# Patient Record
Sex: FEMALE | Race: WHITE | NOT HISPANIC OR LATINO | Employment: OTHER | URBAN - METROPOLITAN AREA
[De-identification: names, ages, dates, MRNs, and addresses within clinical notes are randomized per-mention and may not be internally consistent; named-entity substitution may affect disease eponyms.]

---

## 2019-01-01 ENCOUNTER — ANESTHESIA (OUTPATIENT)
Dept: SURGERY | Facility: HOSPITAL | Age: 84
DRG: 535 | End: 2019-01-01
Payer: MEDICARE

## 2019-01-01 ENCOUNTER — OUTSIDE PLACE OF SERVICE (OUTPATIENT)
Dept: ADMINISTRATIVE | Facility: OTHER | Age: 84
End: 2019-01-01
Payer: MEDICARE

## 2019-01-01 ENCOUNTER — ANESTHESIA EVENT (OUTPATIENT)
Dept: SURGERY | Facility: HOSPITAL | Age: 84
DRG: 535 | End: 2019-01-01
Payer: MEDICARE

## 2019-01-01 ENCOUNTER — HOSPITAL ENCOUNTER (INPATIENT)
Facility: HOSPITAL | Age: 84
LOS: 2 days | DRG: 535 | End: 2019-02-20
Attending: FAMILY MEDICINE | Admitting: INTERNAL MEDICINE
Payer: MEDICARE

## 2019-01-01 VITALS
WEIGHT: 104.94 LBS | TEMPERATURE: 98 F | DIASTOLIC BLOOD PRESSURE: 50 MMHG | OXYGEN SATURATION: 96 % | HEART RATE: 79 BPM | BODY MASS INDEX: 16.87 KG/M2 | HEIGHT: 66 IN | SYSTOLIC BLOOD PRESSURE: 88 MMHG | RESPIRATION RATE: 18 BRPM

## 2019-01-01 DIAGNOSIS — I25.10 CAD (CORONARY ARTERY DISEASE): ICD-10-CM

## 2019-01-01 DIAGNOSIS — S72.002A CLOSED FRACTURE OF LEFT HIP, INITIAL ENCOUNTER: Primary | ICD-10-CM

## 2019-01-01 DIAGNOSIS — S72.002A CLOSED FRACTURE OF LEFT HIP: ICD-10-CM

## 2019-01-01 DIAGNOSIS — I95.9 HYPOTENSION: ICD-10-CM

## 2019-01-01 DIAGNOSIS — K55.9 MESENTERIC ISCHEMIA: ICD-10-CM
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
## 2019-01-01 DIAGNOSIS — K55.1 CHRONIC MESENTERIC ISCHEMIA: ICD-10-CM

## 2019-01-01 LAB
ABO + RH BLD: NORMAL
ALBUMIN SERPL BCP-MCNC: 1.4 G/DL
ALBUMIN SERPL BCP-MCNC: 1.8 G/DL
ALLENS TEST: ABNORMAL
ALP SERPL-CCNC: 110 U/L
ALP SERPL-CCNC: 93 U/L
ALT SERPL W/O P-5'-P-CCNC: 12 U/L
ALT SERPL W/O P-5'-P-CCNC: 23 U/L
ANION GAP SERPL CALC-SCNC: 10 MMOL/L
ANION GAP SERPL CALC-SCNC: 13 MMOL/L
ANION GAP SERPL CALC-SCNC: 15 MMOL/L
ANISOCYTOSIS BLD QL SMEAR: SLIGHT
ANISOCYTOSIS BLD QL SMEAR: SLIGHT
AORTIC VALVE REGURGITATION: ABNORMAL
APTT BLDCRRT: 43.2 SEC
APTT BLDCRRT: 73 SEC
AST SERPL-CCNC: 28 U/L
AST SERPL-CCNC: 66 U/L
BACTERIA #/AREA URNS HPF: ABNORMAL /HPF
BASOPHILS # BLD AUTO: 0.02 K/UL
BASOPHILS # BLD AUTO: 0.02 K/UL
BASOPHILS # BLD AUTO: 0.03 K/UL
BASOPHILS NFR BLD: 0.1 %
BASOPHILS NFR BLD: 0.1 %
BASOPHILS NFR BLD: 0.2 %
BILIRUB DIRECT SERPL-MCNC: 0.3 MG/DL
BILIRUB SERPL-MCNC: 0.4 MG/DL
BILIRUB SERPL-MCNC: 0.6 MG/DL
BILIRUB UR QL STRIP: ABNORMAL
BLD GP AB SCN CELLS X3 SERPL QL: NORMAL
BNP SERPL-MCNC: 545 PG/ML
BNP SERPL-MCNC: 870 PG/ML
BUN SERPL-MCNC: 43 MG/DL
BUN SERPL-MCNC: 45 MG/DL
BUN SERPL-MCNC: 48 MG/DL
BURR CELLS BLD QL SMEAR: ABNORMAL
BURR CELLS BLD QL SMEAR: ABNORMAL
CALCIUM SERPL-MCNC: 6.8 MG/DL
CALCIUM SERPL-MCNC: 7.3 MG/DL
CALCIUM SERPL-MCNC: 7.3 MG/DL
CHLORIDE SERPL-SCNC: 106 MMOL/L
CHLORIDE SERPL-SCNC: 108 MMOL/L
CHLORIDE SERPL-SCNC: 110 MMOL/L
CLARITY UR: CLEAR
CO2 SERPL-SCNC: 11 MMOL/L
CO2 SERPL-SCNC: 12 MMOL/L
CO2 SERPL-SCNC: 19 MMOL/L
COLOR UR: YELLOW
CREAT SERPL-MCNC: 1.7 MG/DL
CREAT SERPL-MCNC: 2.2 MG/DL
CREAT SERPL-MCNC: 2.5 MG/DL
DACRYOCYTES BLD QL SMEAR: ABNORMAL
DELSYS: ABNORMAL
DIASTOLIC DYSFUNCTION: YES
DIFFERENTIAL METHOD: ABNORMAL
EOSINOPHIL # BLD AUTO: 0 K/UL
EOSINOPHIL NFR BLD: 0 %
EOSINOPHIL NFR BLD: 0 %
EOSINOPHIL NFR BLD: 0.1 %
ERYTHROCYTE [DISTWIDTH] IN BLOOD BY AUTOMATED COUNT: 16 %
ERYTHROCYTE [DISTWIDTH] IN BLOOD BY AUTOMATED COUNT: 16.2 %
ERYTHROCYTE [DISTWIDTH] IN BLOOD BY AUTOMATED COUNT: 16.5 %
EST. GFR  (AFRICAN AMERICAN): 20 ML/MIN/1.73 M^2
EST. GFR  (AFRICAN AMERICAN): 24 ML/MIN/1.73 M^2
EST. GFR  (AFRICAN AMERICAN): 32 ML/MIN/1.73 M^2
EST. GFR  (NON AFRICAN AMERICAN): 17 ML/MIN/1.73 M^2
EST. GFR  (NON AFRICAN AMERICAN): 20 ML/MIN/1.73 M^2
EST. GFR  (NON AFRICAN AMERICAN): 28 ML/MIN/1.73 M^2
ESTIMATED AVG GLUCOSE: 97 MG/DL
ESTIMATED PA SYSTOLIC PRESSURE: 43.56
FIO2: 32
FLOW: 3
GLUCOSE SERPL-MCNC: 100 MG/DL
GLUCOSE SERPL-MCNC: 143 MG/DL
GLUCOSE SERPL-MCNC: 69 MG/DL
GLUCOSE UR QL STRIP: NEGATIVE
HBA1C MFR BLD HPLC: 5 %
HCO3 UR-SCNC: 9.2 MMOL/L (ref 24–28)
HCT VFR BLD AUTO: 26.1 %
HCT VFR BLD AUTO: 30.2 %
HCT VFR BLD AUTO: 30.7 %
HGB BLD-MCNC: 10 G/DL
HGB BLD-MCNC: 10.1 G/DL
HGB BLD-MCNC: 8.6 G/DL
HGB UR QL STRIP: NEGATIVE
INFLUENZA A, MOLECULAR: NEGATIVE
INFLUENZA B, MOLECULAR: NEGATIVE
INR PPP: 1.3
INR PPP: 2.1
KETONES UR QL STRIP: ABNORMAL
LACTATE SERPL-SCNC: 0.9 MMOL/L
LACTATE SERPL-SCNC: 2 MMOL/L
LACTATE SERPL-SCNC: 3.9 MMOL/L
LACTATE SERPL-SCNC: 4 MMOL/L
LACTATE SERPL-SCNC: 7.6 MMOL/L
LEUKOCYTE ESTERASE UR QL STRIP: ABNORMAL
LYMPHOCYTES # BLD AUTO: 0.5 K/UL
LYMPHOCYTES # BLD AUTO: 0.7 K/UL
LYMPHOCYTES # BLD AUTO: 0.8 K/UL
LYMPHOCYTES NFR BLD: 2.5 %
LYMPHOCYTES NFR BLD: 2.6 %
LYMPHOCYTES NFR BLD: 4 %
MAGNESIUM SERPL-MCNC: 1.4 MG/DL
MAGNESIUM SERPL-MCNC: 1.6 MG/DL
MAGNESIUM SERPL-MCNC: 1.9 MG/DL
MCH RBC QN AUTO: 29.7 PG
MCH RBC QN AUTO: 30.1 PG
MCH RBC QN AUTO: 30.4 PG
MCHC RBC AUTO-ENTMCNC: 32.6 G/DL
MCHC RBC AUTO-ENTMCNC: 33 G/DL
MCHC RBC AUTO-ENTMCNC: 33.4 G/DL
MCV RBC AUTO: 91 FL
MICROSCOPIC COMMENT: ABNORMAL
MITRAL VALVE REGURGITATION: ABNORMAL
MODE: ABNORMAL
MONOCYTES # BLD AUTO: 0.9 K/UL
MONOCYTES # BLD AUTO: 1.3 K/UL
MONOCYTES # BLD AUTO: 1.5 K/UL
MONOCYTES NFR BLD: 4.7 %
MONOCYTES NFR BLD: 4.7 %
MONOCYTES NFR BLD: 7.1 %
NEUTROPHILS # BLD AUTO: 11.1 K/UL
NEUTROPHILS # BLD AUTO: 24.6 K/UL
NEUTROPHILS # BLD AUTO: 28.8 K/UL
NEUTROPHILS NFR BLD: 89.2 %
NEUTROPHILS NFR BLD: 94.1 %
NEUTROPHILS NFR BLD: 96.1 %
NITRITE UR QL STRIP: NEGATIVE
OVALOCYTES BLD QL SMEAR: ABNORMAL
PCO2 BLDA: 34.1 MMHG (ref 35–45)
PH SMN: 7.04 [PH] (ref 7.35–7.45)
PH UR STRIP: 6 [PH] (ref 5–8)
PHOSPHATE SERPL-MCNC: 3.9 MG/DL
PHOSPHATE SERPL-MCNC: 4.9 MG/DL
PLATELET # BLD AUTO: 156 K/UL
PLATELET # BLD AUTO: 166 K/UL
PLATELET # BLD AUTO: 189 K/UL
PLATELET BLD QL SMEAR: ABNORMAL
PLATELET BLD QL SMEAR: ABNORMAL
PMV BLD AUTO: 10.2 FL
PMV BLD AUTO: 10.5 FL
PMV BLD AUTO: 10.8 FL
PO2 BLDA: 18 MMHG (ref 40–60)
POC BE: -21 MMOL/L
POC SATURATED O2: 14 % (ref 95–100)
POCT GLUCOSE: 109 MG/DL (ref 70–110)
POCT GLUCOSE: 134 MG/DL (ref 70–110)
POCT GLUCOSE: 164 MG/DL (ref 70–110)
POCT GLUCOSE: 28 MG/DL (ref 70–110)
POIKILOCYTOSIS BLD QL SMEAR: SLIGHT
POLYCHROMASIA BLD QL SMEAR: ABNORMAL
POLYCHROMASIA BLD QL SMEAR: ABNORMAL
POTASSIUM SERPL-SCNC: 4.1 MMOL/L
POTASSIUM SERPL-SCNC: 4.3 MMOL/L
POTASSIUM SERPL-SCNC: 4.6 MMOL/L
PROCALCITONIN SERPL IA-MCNC: 0.61 NG/ML
PROCALCITONIN SERPL IA-MCNC: 1.41 NG/ML
PROT SERPL-MCNC: 3.5 G/DL
PROT SERPL-MCNC: 4.1 G/DL
PROT UR QL STRIP: NEGATIVE
PROTHROMBIN TIME: 13.7 SEC
PROTHROMBIN TIME: 21.7 SEC
RBC # BLD AUTO: 2.86 M/UL
RBC # BLD AUTO: 3.32 M/UL
RBC # BLD AUTO: 3.37 M/UL
RETIRED EF AND QEF - SEE NOTES: 55 (ref 55–65)
SAMPLE: ABNORMAL
SCHISTOCYTES BLD QL SMEAR: PRESENT
SCHISTOCYTES BLD QL SMEAR: PRESENT
SITE: ABNORMAL
SODIUM SERPL-SCNC: 134 MMOL/L
SODIUM SERPL-SCNC: 135 MMOL/L
SODIUM SERPL-SCNC: 135 MMOL/L
SP GR UR STRIP: 1.02 (ref 1–1.03)
SPECIMEN SOURCE: NORMAL
SPHEROCYTES BLD QL SMEAR: ABNORMAL
STOMATOCYTES BLD QL SMEAR: PRESENT
T4 FREE SERPL-MCNC: 0.44 NG/DL
TRICUSPID VALVE REGURGITATION: ABNORMAL
TROPONIN I SERPL DL<=0.01 NG/ML-MCNC: 0.02 NG/ML
TSH SERPL DL<=0.005 MIU/L-ACNC: 8.7 UIU/ML
URN SPEC COLLECT METH UR: ABNORMAL
UROBILINOGEN UR STRIP-ACNC: NEGATIVE EU/DL
VANCOMYCIN SERPL-MCNC: 11 UG/ML
VANCOMYCIN SERPL-MCNC: 15.8 UG/ML
WBC # BLD AUTO: 12.52 K/UL
WBC # BLD AUTO: 26.53 K/UL
WBC # BLD AUTO: 31.16 K/UL
WBC #/AREA URNS HPF: 50 /HPF (ref 0–5)
WBC CLUMPS URNS QL MICRO: ABNORMAL

## 2019-01-01 PROCEDURE — 99222 1ST HOSP IP/OBS MODERATE 55: CPT | Mod: ,,, | Performed by: SURGERY

## 2019-01-01 PROCEDURE — 75726 CHG ANGIO VISCERAL SELECTV/SUBSELEC: ICD-10-PCS | Mod: 26,,, | Performed by: THORACIC SURGERY (CARDIOTHORACIC VASCULAR SURGERY)

## 2019-01-01 PROCEDURE — 96375 TX/PRO/DX INJ NEW DRUG ADDON: CPT

## 2019-01-01 PROCEDURE — 85730 THROMBOPLASTIN TIME PARTIAL: CPT

## 2019-01-01 PROCEDURE — 25000003 PHARM REV CODE 250: Performed by: NURSE PRACTITIONER

## 2019-01-01 PROCEDURE — 84439 ASSAY OF FREE THYROXINE: CPT

## 2019-01-01 PROCEDURE — 87186 SC STD MICRODIL/AGAR DIL: CPT | Mod: 59

## 2019-01-01 PROCEDURE — 93010 ELECTROCARDIOGRAM REPORT: CPT | Mod: ,,, | Performed by: INTERNAL MEDICINE

## 2019-01-01 PROCEDURE — 80048 BASIC METABOLIC PNL TOTAL CA: CPT

## 2019-01-01 PROCEDURE — 93306 TTE W/DOPPLER COMPLETE: CPT | Mod: 26,,, | Performed by: INTERNAL MEDICINE

## 2019-01-01 PROCEDURE — 99152 MOD SED SAME PHYS/QHP 5/>YRS: CPT | Mod: ,,, | Performed by: THORACIC SURGERY (CARDIOTHORACIC VASCULAR SURGERY)

## 2019-01-01 PROCEDURE — 25000003 PHARM REV CODE 250: Performed by: INTERNAL MEDICINE

## 2019-01-01 PROCEDURE — 99223 1ST HOSP IP/OBS HIGH 75: CPT | Mod: ,,, | Performed by: ORTHOPAEDIC SURGERY

## 2019-01-01 PROCEDURE — 99900037 HC PT THERAPY SCREENING (STAT)

## 2019-01-01 PROCEDURE — 63600175 PHARM REV CODE 636 W HCPCS: Performed by: NURSE PRACTITIONER

## 2019-01-01 PROCEDURE — 94761 N-INVAS EAR/PLS OXIMETRY MLT: CPT

## 2019-01-01 PROCEDURE — 87077 CULTURE AEROBIC IDENTIFY: CPT | Mod: 59

## 2019-01-01 PROCEDURE — 99900035 HC TECH TIME PER 15 MIN (STAT)

## 2019-01-01 PROCEDURE — 84145 PROCALCITONIN (PCT): CPT

## 2019-01-01 PROCEDURE — 93010 EKG 12-LEAD: ICD-10-PCS | Mod: ,,, | Performed by: INTERNAL MEDICINE

## 2019-01-01 PROCEDURE — 83605 ASSAY OF LACTIC ACID: CPT

## 2019-01-01 PROCEDURE — 87502 INFLUENZA DNA AMP PROBE: CPT

## 2019-01-01 PROCEDURE — 27000221 HC OXYGEN, UP TO 24 HOURS

## 2019-01-01 PROCEDURE — 36245 PR INS CATH ABD/L-EXT ART 1ST ORDER: ICD-10-PCS | Mod: ,,, | Performed by: THORACIC SURGERY (CARDIOTHORACIC VASCULAR SURGERY)

## 2019-01-01 PROCEDURE — 21400001 HC TELEMETRY ROOM

## 2019-01-01 PROCEDURE — 63600175 PHARM REV CODE 636 W HCPCS: Performed by: INTERNAL MEDICINE

## 2019-01-01 PROCEDURE — 99223 PR INITIAL HOSPITAL CARE,LEVL III: ICD-10-PCS | Mod: ,,, | Performed by: ORTHOPAEDIC SURGERY

## 2019-01-01 PROCEDURE — 75726 ARTERY X-RAYS ABDOMEN: CPT | Mod: 26,,, | Performed by: THORACIC SURGERY (CARDIOTHORACIC VASCULAR SURGERY)

## 2019-01-01 PROCEDURE — 85025 COMPLETE CBC W/AUTO DIFF WBC: CPT

## 2019-01-01 PROCEDURE — 93005 ELECTROCARDIOGRAM TRACING: CPT

## 2019-01-01 PROCEDURE — 83605 ASSAY OF LACTIC ACID: CPT | Mod: 91

## 2019-01-01 PROCEDURE — 87086 URINE CULTURE/COLONY COUNT: CPT

## 2019-01-01 PROCEDURE — 99285 EMERGENCY DEPT VISIT HI MDM: CPT | Mod: 25

## 2019-01-01 PROCEDURE — 36415 COLL VENOUS BLD VENIPUNCTURE: CPT

## 2019-01-01 PROCEDURE — 82962 GLUCOSE BLOOD TEST: CPT

## 2019-01-01 PROCEDURE — 93306 2D ECHO WITH COLOR FLOW DOPPLER: ICD-10-PCS | Mod: 26,,, | Performed by: INTERNAL MEDICINE

## 2019-01-01 PROCEDURE — 86850 RBC ANTIBODY SCREEN: CPT

## 2019-01-01 PROCEDURE — 25000003 PHARM REV CODE 250: Performed by: FAMILY MEDICINE

## 2019-01-01 PROCEDURE — 99152 PR MOD CONSCIOUS SEDATION, SAME PHYS, 5+ YRS, FIRST 15 MIN: ICD-10-PCS | Mod: ,,, | Performed by: THORACIC SURGERY (CARDIOTHORACIC VASCULAR SURGERY)

## 2019-01-01 PROCEDURE — 99222 PR INITIAL HOSPITAL CARE,LEVL II: ICD-10-PCS | Mod: ,,, | Performed by: SURGERY

## 2019-01-01 PROCEDURE — 80202 ASSAY OF VANCOMYCIN: CPT

## 2019-01-01 PROCEDURE — 85610 PROTHROMBIN TIME: CPT

## 2019-01-01 PROCEDURE — 96365 THER/PROPH/DIAG IV INF INIT: CPT

## 2019-01-01 PROCEDURE — 63600175 PHARM REV CODE 636 W HCPCS: Performed by: FAMILY MEDICINE

## 2019-01-01 PROCEDURE — 36245 INS CATH ABD/L-EXT ART 1ST: CPT | Mod: ,,, | Performed by: THORACIC SURGERY (CARDIOTHORACIC VASCULAR SURGERY)

## 2019-01-01 PROCEDURE — 81000 URINALYSIS NONAUTO W/SCOPE: CPT

## 2019-01-01 PROCEDURE — 87040 BLOOD CULTURE FOR BACTERIA: CPT | Mod: 59

## 2019-01-01 PROCEDURE — 93306 TTE W/DOPPLER COMPLETE: CPT

## 2019-01-01 PROCEDURE — 87088 URINE BACTERIA CULTURE: CPT

## 2019-01-01 PROCEDURE — 83036 HEMOGLOBIN GLYCOSYLATED A1C: CPT

## 2019-01-01 PROCEDURE — 96366 THER/PROPH/DIAG IV INF ADDON: CPT

## 2019-01-01 PROCEDURE — 99900038 HC OT GENERIC THERAPY SCREENING (STAT): Performed by: PHYSICAL THERAPIST

## 2019-01-01 PROCEDURE — 87040 BLOOD CULTURE FOR BACTERIA: CPT

## 2019-01-01 PROCEDURE — 80076 HEPATIC FUNCTION PANEL: CPT

## 2019-01-01 PROCEDURE — 84484 ASSAY OF TROPONIN QUANT: CPT

## 2019-01-01 PROCEDURE — 83880 ASSAY OF NATRIURETIC PEPTIDE: CPT

## 2019-01-01 PROCEDURE — 84443 ASSAY THYROID STIM HORMONE: CPT

## 2019-01-01 PROCEDURE — 84100 ASSAY OF PHOSPHORUS: CPT

## 2019-01-01 PROCEDURE — 99232 SBSQ HOSP IP/OBS MODERATE 35: CPT | Mod: ,,, | Performed by: THORACIC SURGERY (CARDIOTHORACIC VASCULAR SURGERY)

## 2019-01-01 PROCEDURE — 99232 PR SUBSEQUENT HOSPITAL CARE,LEVL II: ICD-10-PCS | Mod: ,,, | Performed by: THORACIC SURGERY (CARDIOTHORACIC VASCULAR SURGERY)

## 2019-01-01 PROCEDURE — 83735 ASSAY OF MAGNESIUM: CPT

## 2019-01-01 PROCEDURE — 51798 US URINE CAPACITY MEASURE: CPT

## 2019-01-01 PROCEDURE — 80053 COMPREHEN METABOLIC PANEL: CPT

## 2019-01-01 PROCEDURE — 25000003 PHARM REV CODE 250: Performed by: PHYSICIAN ASSISTANT

## 2019-01-01 PROCEDURE — 96367 TX/PROPH/DG ADDL SEQ IV INF: CPT

## 2019-01-01 PROCEDURE — 99900038 HC OT GENERIC THERAPY SCREENING (STAT)

## 2019-01-01 RX ORDER — ACETAMINOPHEN 500 MG
1000 TABLET ORAL EVERY 8 HOURS
Status: DISCONTINUED | OUTPATIENT
Start: 2019-01-01 | End: 2019-01-01

## 2019-01-01 RX ORDER — MORPHINE SULFATE 10 MG/ML
4 INJECTION INTRAMUSCULAR; INTRAVENOUS; SUBCUTANEOUS
Status: COMPLETED | OUTPATIENT
Start: 2019-01-01 | End: 2019-01-01

## 2019-01-01 RX ORDER — SODIUM CHLORIDE 0.9 % (FLUSH) 0.9 %
5 SYRINGE (ML) INJECTION
Status: DISCONTINUED | OUTPATIENT
Start: 2019-01-01 | End: 2019-01-01 | Stop reason: HOSPADM

## 2019-01-01 RX ORDER — LEVOTHYROXINE SODIUM ANHYDROUS 100 UG/5ML
100 INJECTION, POWDER, LYOPHILIZED, FOR SOLUTION INTRAVENOUS DAILY
Status: DISCONTINUED | OUTPATIENT
Start: 2019-01-01 | End: 2019-01-01 | Stop reason: HOSPADM

## 2019-01-01 RX ORDER — LEVOTHYROXINE SODIUM 100 UG/1
200 TABLET ORAL ONCE
Status: DISCONTINUED | OUTPATIENT
Start: 2019-01-01 | End: 2019-01-01

## 2019-01-01 RX ORDER — ACETAMINOPHEN 500 MG
1000 TABLET ORAL EVERY 8 HOURS
Status: DISCONTINUED | OUTPATIENT
Start: 2019-01-01 | End: 2019-01-01 | Stop reason: HOSPADM

## 2019-01-01 RX ORDER — DIGOXIN 125 MCG
125 TABLET ORAL DAILY
Status: ON HOLD | COMMUNITY
End: 2019-01-01 | Stop reason: HOSPADM

## 2019-01-01 RX ORDER — LEVOTHYROXINE SODIUM 100 UG/1
100 TABLET ORAL DAILY
Status: ON HOLD | COMMUNITY
End: 2019-01-01 | Stop reason: HOSPADM

## 2019-01-01 RX ORDER — ASPIRIN 81 MG/1
81 TABLET ORAL DAILY
Status: ON HOLD | COMMUNITY
End: 2019-01-01 | Stop reason: HOSPADM

## 2019-01-01 RX ORDER — ROSUVASTATIN CALCIUM 10 MG/1
10 TABLET, COATED ORAL DAILY
Status: ON HOLD | COMMUNITY
End: 2019-01-01 | Stop reason: HOSPADM

## 2019-01-01 RX ORDER — IBUPROFEN 200 MG
16 TABLET ORAL
Status: DISCONTINUED | OUTPATIENT
Start: 2019-01-01 | End: 2019-01-01 | Stop reason: HOSPADM

## 2019-01-01 RX ORDER — METOPROLOL SUCCINATE 50 MG/1
200 TABLET, EXTENDED RELEASE ORAL DAILY
Status: DISCONTINUED | OUTPATIENT
Start: 2019-01-01 | End: 2019-01-01

## 2019-01-01 RX ORDER — GLUCAGON 1 MG
1 KIT INJECTION
Status: DISCONTINUED | OUTPATIENT
Start: 2019-01-01 | End: 2019-01-01 | Stop reason: HOSPADM

## 2019-01-01 RX ORDER — ACETAMINOPHEN 325 MG/1
650 TABLET ORAL EVERY 8 HOURS PRN
Status: DISCONTINUED | OUTPATIENT
Start: 2019-01-01 | End: 2019-01-01 | Stop reason: HOSPADM

## 2019-01-01 RX ORDER — VANCOMYCIN HCL IN 5 % DEXTROSE 1G/250ML
1000 PLASTIC BAG, INJECTION (ML) INTRAVENOUS
Status: COMPLETED | OUTPATIENT
Start: 2019-01-01 | End: 2019-01-01

## 2019-01-01 RX ORDER — OXYBUTYNIN CHLORIDE 5 MG/1
5 TABLET ORAL 2 TIMES DAILY
Status: ON HOLD | COMMUNITY
End: 2019-01-01 | Stop reason: HOSPADM

## 2019-01-01 RX ORDER — MEMANTINE HYDROCHLORIDE 5 MG/1
5 TABLET ORAL 2 TIMES DAILY
Status: DISCONTINUED | OUTPATIENT
Start: 2019-01-01 | End: 2019-01-01

## 2019-01-01 RX ORDER — MAGNESIUM SULFATE HEPTAHYDRATE 40 MG/ML
2 INJECTION, SOLUTION INTRAVENOUS ONCE
Status: COMPLETED | OUTPATIENT
Start: 2019-01-01 | End: 2019-01-01

## 2019-01-01 RX ORDER — MORPHINE SULFATE 2 MG/ML
2 INJECTION, SOLUTION INTRAMUSCULAR; INTRAVENOUS EVERY 6 HOURS PRN
Status: DISCONTINUED | OUTPATIENT
Start: 2019-01-01 | End: 2019-01-01

## 2019-01-01 RX ORDER — MEROPENEM AND SODIUM CHLORIDE 500 MG/50ML
500 INJECTION, SOLUTION INTRAVENOUS
Status: DISCONTINUED | OUTPATIENT
Start: 2019-01-01 | End: 2019-01-01 | Stop reason: HOSPADM

## 2019-01-01 RX ORDER — MIDODRINE HYDROCHLORIDE 2.5 MG/1
2.5 TABLET ORAL 2 TIMES DAILY WITH MEALS
Status: DISCONTINUED | OUTPATIENT
Start: 2019-01-01 | End: 2019-01-01

## 2019-01-01 RX ORDER — SODIUM CHLORIDE 9 MG/ML
INJECTION, SOLUTION INTRAVENOUS CONTINUOUS
Status: CANCELLED | OUTPATIENT
Start: 2019-01-01

## 2019-01-01 RX ORDER — ASPIRIN 81 MG/1
81 TABLET ORAL DAILY
Status: DISCONTINUED | OUTPATIENT
Start: 2019-01-01 | End: 2019-01-01 | Stop reason: HOSPADM

## 2019-01-01 RX ORDER — SODIUM CHLORIDE 9 MG/ML
INJECTION, SOLUTION INTRAVENOUS CONTINUOUS
Status: DISCONTINUED | OUTPATIENT
Start: 2019-01-01 | End: 2019-01-01 | Stop reason: HOSPADM

## 2019-01-01 RX ORDER — DIGOXIN 0.25 MG/ML
125 INJECTION INTRAMUSCULAR; INTRAVENOUS DAILY
Status: DISCONTINUED | OUTPATIENT
Start: 2019-01-01 | End: 2019-01-01 | Stop reason: HOSPADM

## 2019-01-01 RX ORDER — IBUPROFEN 200 MG
24 TABLET ORAL
Status: DISCONTINUED | OUTPATIENT
Start: 2019-01-01 | End: 2019-01-01 | Stop reason: HOSPADM

## 2019-01-01 RX ORDER — LEVOTHYROXINE SODIUM 100 UG/1
100 TABLET ORAL
Status: DISCONTINUED | OUTPATIENT
Start: 2019-01-01 | End: 2019-01-01

## 2019-01-01 RX ORDER — SODIUM CHLORIDE 9 MG/ML
INJECTION, SOLUTION INTRAVENOUS CONTINUOUS
Status: DISCONTINUED | OUTPATIENT
Start: 2019-01-01 | End: 2019-01-01

## 2019-01-01 RX ORDER — ONDANSETRON 2 MG/ML
4 INJECTION INTRAMUSCULAR; INTRAVENOUS EVERY 8 HOURS PRN
Status: DISCONTINUED | OUTPATIENT
Start: 2019-01-01 | End: 2019-01-01 | Stop reason: HOSPADM

## 2019-01-01 RX ORDER — METOPROLOL SUCCINATE 100 MG/1
200 TABLET, EXTENDED RELEASE ORAL DAILY
Status: ON HOLD | COMMUNITY
End: 2019-01-01 | Stop reason: HOSPADM

## 2019-01-01 RX ORDER — LEVOTHYROXINE SODIUM 112 UG/1
112 TABLET ORAL
Status: DISCONTINUED | OUTPATIENT
Start: 2019-02-21 | End: 2019-01-01

## 2019-01-01 RX ORDER — METHYLPREDNISOLONE SOD SUCC 125 MG
125 VIAL (EA) INJECTION ONCE
Status: COMPLETED | OUTPATIENT
Start: 2019-01-01 | End: 2019-01-01

## 2019-01-01 RX ORDER — DEXTROSE MONOHYDRATE 25 G/50ML
25 INJECTION, SOLUTION INTRAVENOUS
Status: COMPLETED | OUTPATIENT
Start: 2019-01-01 | End: 2019-01-01

## 2019-01-01 RX ORDER — TRAMADOL HYDROCHLORIDE 50 MG/1
50 TABLET ORAL EVERY 8 HOURS PRN
Status: DISCONTINUED | OUTPATIENT
Start: 2019-01-01 | End: 2019-01-01 | Stop reason: HOSPADM

## 2019-01-01 RX ORDER — METOPROLOL SUCCINATE 50 MG/1
100 TABLET, EXTENDED RELEASE ORAL DAILY
Status: DISCONTINUED | OUTPATIENT
Start: 2019-01-01 | End: 2019-01-01

## 2019-01-01 RX ORDER — DIGOXIN 125 MCG
125 TABLET ORAL DAILY
Status: DISCONTINUED | OUTPATIENT
Start: 2019-01-01 | End: 2019-01-01

## 2019-01-01 RX ORDER — MEMANTINE HYDROCHLORIDE 10 MG/1
5 TABLET ORAL 2 TIMES DAILY
Status: ON HOLD | COMMUNITY
End: 2019-01-01 | Stop reason: HOSPADM

## 2019-01-01 RX ORDER — OXYBUTYNIN CHLORIDE 5 MG/1
5 TABLET ORAL 2 TIMES DAILY
Status: DISCONTINUED | OUTPATIENT
Start: 2019-01-01 | End: 2019-01-01

## 2019-01-01 RX ORDER — ROSUVASTATIN CALCIUM 10 MG/1
10 TABLET, COATED ORAL NIGHTLY
Status: DISCONTINUED | OUTPATIENT
Start: 2019-01-01 | End: 2019-01-01

## 2019-01-01 RX ADMIN — MEMANTINE HYDROCHLORIDE 5 MG: 5 TABLET ORAL at 09:02

## 2019-01-01 RX ADMIN — DIGOXIN 125 MCG: 0.25 INJECTION INTRAMUSCULAR; INTRAVENOUS at 09:02

## 2019-01-01 RX ADMIN — SODIUM CHLORIDE: 0.9 INJECTION, SOLUTION INTRAVENOUS at 01:02

## 2019-01-01 RX ADMIN — SODIUM CHLORIDE: 0.9 INJECTION, SOLUTION INTRAVENOUS at 05:02

## 2019-01-01 RX ADMIN — SODIUM CHLORIDE 500 ML: 0.9 INJECTION, SOLUTION INTRAVENOUS at 03:02

## 2019-01-01 RX ADMIN — SODIUM CHLORIDE: 0.9 INJECTION, SOLUTION INTRAVENOUS at 07:02

## 2019-01-01 RX ADMIN — VANCOMYCIN HYDROCHLORIDE 750 MG: 750 INJECTION, POWDER, LYOPHILIZED, FOR SOLUTION INTRAVENOUS at 05:02

## 2019-01-01 RX ADMIN — MAGNESIUM SULFATE IN WATER 2 G: 40 INJECTION, SOLUTION INTRAVENOUS at 09:02

## 2019-01-01 RX ADMIN — SODIUM CHLORIDE 1428 ML: 0.9 INJECTION, SOLUTION INTRAVENOUS at 11:02

## 2019-01-01 RX ADMIN — DEXTROSE MONOHYDRATE 25 G: 25 INJECTION, SOLUTION INTRAVENOUS at 09:02

## 2019-01-01 RX ADMIN — CEFTRIAXONE 2 G: 2 INJECTION, SOLUTION INTRAVENOUS at 05:02

## 2019-01-01 RX ADMIN — ONDANSETRON 4 MG: 2 INJECTION INTRAMUSCULAR; INTRAVENOUS at 12:02

## 2019-01-01 RX ADMIN — MEROPENEM AND SODIUM CHLORIDE 500 MG: 500 INJECTION, SOLUTION INTRAVENOUS at 03:02

## 2019-01-01 RX ADMIN — OXYBUTYNIN CHLORIDE 5 MG: 5 TABLET ORAL at 08:02

## 2019-01-01 RX ADMIN — APIXABAN 5 MG: 2.5 TABLET, FILM COATED ORAL at 09:02

## 2019-01-01 RX ADMIN — VANCOMYCIN HYDROCHLORIDE 1000 MG: 1 INJECTION, POWDER, FOR SOLUTION INTRAVENOUS at 11:02

## 2019-01-01 RX ADMIN — OXYBUTYNIN CHLORIDE 5 MG: 5 TABLET ORAL at 09:02

## 2019-01-01 RX ADMIN — DIGOXIN 125 MCG: 125 TABLET ORAL at 08:02

## 2019-01-01 RX ADMIN — MORPHINE SULFATE 4 MG: 10 INJECTION INTRAVENOUS at 11:02

## 2019-01-01 RX ADMIN — LEVOTHYROXINE SODIUM 100 MCG: 100 TABLET ORAL at 05:02

## 2019-01-01 RX ADMIN — ASPIRIN 81 MG: 81 TABLET, COATED ORAL at 08:02

## 2019-01-01 RX ADMIN — MEMANTINE HYDROCHLORIDE 5 MG: 5 TABLET ORAL at 08:02

## 2019-01-01 RX ADMIN — TRAMADOL HYDROCHLORIDE 50 MG: 50 TABLET, COATED ORAL at 09:02

## 2019-01-01 RX ADMIN — APIXABAN 5 MG: 2.5 TABLET, FILM COATED ORAL at 08:02

## 2019-01-01 RX ADMIN — DEXTROSE MONOHYDRATE 12.5 G: 25 INJECTION, SOLUTION INTRAVENOUS at 05:02

## 2019-01-01 RX ADMIN — PIPERACILLIN AND TAZOBACTAM 4.5 G: 4; .5 INJECTION, POWDER, LYOPHILIZED, FOR SOLUTION INTRAVENOUS; PARENTERAL at 11:02

## 2019-01-01 RX ADMIN — ROSUVASTATIN CALCIUM 10 MG: 10 TABLET, FILM COATED ORAL at 09:02

## 2019-01-01 RX ADMIN — MIDODRINE HYDROCHLORIDE 2.5 MG: 2.5 TABLET ORAL at 12:02

## 2019-01-01 RX ADMIN — METOPROLOL SUCCINATE 200 MG: 50 TABLET, EXTENDED RELEASE ORAL at 08:02

## 2019-01-01 RX ADMIN — ACETAMINOPHEN 650 MG: 325 TABLET ORAL at 12:02

## 2019-01-01 RX ADMIN — METHYLPREDNISOLONE SODIUM SUCCINATE 125 MG: 125 INJECTION, POWDER, FOR SOLUTION INTRAMUSCULAR; INTRAVENOUS at 05:02

## 2019-01-01 RX ADMIN — MEROPENEM AND SODIUM CHLORIDE 500 MG: 500 INJECTION, SOLUTION INTRAVENOUS at 01:02

## 2019-01-01 RX ADMIN — ACETAMINOPHEN 650 MG: 325 TABLET ORAL at 05:02

## 2019-01-01 RX ADMIN — ACETAMINOPHEN 1000 MG: 500 TABLET ORAL at 09:02

## 2019-01-01 RX ADMIN — VANCOMYCIN HYDROCHLORIDE 750 MG: 750 INJECTION, POWDER, LYOPHILIZED, FOR SOLUTION INTRAVENOUS at 03:02

## 2019-01-01 RX ADMIN — TRAMADOL HYDROCHLORIDE 50 MG: 50 TABLET, COATED ORAL at 08:02

## 2019-02-18 PROBLEM — Z87.891 FORMER SMOKER, STOPPED SMOKING IN DISTANT PAST: Status: ACTIVE | Noted: 2019-01-01

## 2019-02-18 PROBLEM — Z86.010 HISTORY OF COLONOSCOPY WITH POLYPECTOMY: Status: ACTIVE | Noted: 2019-01-01

## 2019-02-18 PROBLEM — E87.1 DEHYDRATION WITH HYPONATREMIA: Status: ACTIVE | Noted: 2019-01-01

## 2019-02-18 PROBLEM — N39.0 RECURRENT UTI: Status: ACTIVE | Noted: 2018-01-01

## 2019-02-18 PROBLEM — K21.00 GASTROESOPHAGEAL REFLUX DISEASE WITH ESOPHAGITIS: Status: ACTIVE | Noted: 2018-01-01

## 2019-02-18 PROBLEM — Z79.01 CURRENT USE OF LONG TERM ANTICOAGULATION: Status: ACTIVE | Noted: 2019-01-01

## 2019-02-18 PROBLEM — Z86.79 HISTORY OF CORONARY ARTERY DISEASE: Status: ACTIVE | Noted: 2019-01-01

## 2019-02-18 PROBLEM — Z87.19 HISTORY OF ESOPHAGEAL STRICTURE: Status: ACTIVE | Noted: 2017-02-22

## 2019-02-18 PROBLEM — K55.1 CHRONIC MESENTERIC ISCHEMIA: Status: ACTIVE | Noted: 2019-01-01

## 2019-02-18 PROBLEM — Z95.1 HISTORY OF CORONARY ARTERY BYPASS GRAFT: Status: ACTIVE | Noted: 2019-01-01

## 2019-02-18 PROBLEM — Z85.3 HISTORY OF BREAST CANCER: Status: ACTIVE | Noted: 2017-08-22

## 2019-02-18 PROBLEM — D69.6 THROMBOCYTOPENIA: Status: ACTIVE | Noted: 2019-01-01

## 2019-02-18 PROBLEM — E86.0 DEHYDRATION WITH HYPONATREMIA: Status: ACTIVE | Noted: 2019-01-01

## 2019-02-18 PROBLEM — K57.10 DUODENAL DIVERTICULUM: Status: ACTIVE | Noted: 2019-01-01

## 2019-02-18 PROBLEM — S72.002A CLOSED FRACTURE OF LEFT HIP: Status: ACTIVE | Noted: 2019-01-01

## 2019-02-18 PROBLEM — I74.09 AORTOILIAC OCCLUSIVE DISEASE: Status: ACTIVE | Noted: 2019-01-01

## 2019-02-18 PROBLEM — Z98.890 HISTORY OF COLONOSCOPY WITH POLYPECTOMY: Status: ACTIVE | Noted: 2019-01-01

## 2019-02-18 PROBLEM — R41.3 MEMORY CHANGES: Status: ACTIVE | Noted: 2018-01-01

## 2019-02-18 PROBLEM — E43 UNSPECIFIED SEVERE PROTEIN-CALORIE MALNUTRITION: Status: ACTIVE | Noted: 2019-01-01

## 2019-02-18 NOTE — ED NOTES
"Edema noted to left elbow. Pt and family report that patient was hospitalized at [facility] where she was "receiving IV infusion and it came out of the vein and into the patient's arm."  "

## 2019-02-18 NOTE — ED PROVIDER NOTES
SCRIBE #1 NOTE: I, Corinne Mack, am scribing for, and in the presence of, Regine Polk MD. I have scribed the entire note.      History      Chief Complaint   Patient presents with    Fall     Pt fell at home and  is experiencing pain, numbness and tingling in the left hip and left leg       Review of patient's allergies indicates:  Allergies not on file     HPI   HPI    2/18/2019, 9:22 AM   History obtained from the patient      History of Present Illness: Bernarda Mathis is a 83 y.o. female patient who presents to the Emergency Department for L hip pain which onset suddenly this morning. Pt left [facility] yesterday after being admitted for 5 days to Tx the pt's L iliac artery clot. The cath could not be passed through the clot and they were awaiting treatment when they became discouraged and left AMA. Pt fell while attempting to go to the bathroom this morning and is now c/o L hip pain. Symptoms are constant and moderate in severity. Pt's pain is worse with movement and palpation. No mitigating factors reported. No associated sxs reported. Patient denies any fever, chills, head injury, CP, SOB, N/V/D, abd pain, back pain, neck pain, HA, dizziness, syncope, and all other sxs at this time. No prior Tx reported. No further complaints or concerns at this time.         Arrival mode: AASI    PCP: Provider [facility]       Past Medical History:  Past Medical History:   Diagnosis Date    Breast cancer     Coronary artery disease     Thyroid disease     Urinary frequency        Past Surgical History:  Past Surgical History:   Procedure Laterality Date    APPENDECTOMY      BILATERAL MASTECTOMY      BLADDER SUSPENSION      BREAST SURGERY      CARDIAC SURGERY      CHOLECYSTECTOMY      CORONARY ARTERY BYPASS GRAFT      ILIAC ARTERY STENT      INSERTION OF PACEMAKER           Family History:  History reviewed. No pertinent family history.    Social History:  Social History     Tobacco Use     Smoking status: Former Smoker     Types: Cigarettes     Last attempt to quit: 2000     Years since quittin.1    Smokeless tobacco: Never Used   Substance and Sexual Activity    Alcohol use: No     Frequency: Never    Drug use: No    Sexual activity: Unknown        ROS   Review of Systems   Constitutional: Negative for chills and fever.   HENT: Negative for nosebleeds.         (-) head injury   Eyes: Negative for pain.   Respiratory: Negative for cough and shortness of breath.    Cardiovascular: Negative for chest pain and leg swelling.   Gastrointestinal: Negative for abdominal pain, diarrhea, nausea and vomiting.   Genitourinary: Negative for dysuria.   Musculoskeletal: Positive for arthralgias (L hip). Negative for back pain, neck pain and neck stiffness.        (+) fall   Skin: Negative for rash and wound.   Neurological: Negative for dizziness, syncope, light-headedness, numbness and headaches.   Psychiatric/Behavioral: Negative for confusion.   All other systems reviewed and are negative.    Physical Exam      Initial Vitals [19 0939]   BP Pulse Resp Temp SpO2   (!) 93/47 64 20 (!) 95.2 °F (35.1 °C) 100 %      MAP       --          Physical Exam  Nursing Notes and Vital Signs Reviewed.  Constitutional: Patient is in no acute distress. Awake and alert. Appropriate for age.   Head: Atraumatic. No facial instability or step-offs.   Eyes: PERRL. EOM normal. Conjunctivae normal.   HENT: Moist mucous membranes. No epistaxis. Patent airway.   Neck: No midline bony tenderness, deformities, or step-offs   Cardiovascular: Regular rate and rhythm. Heart sounds are normal. Intact distal pulses   Pulmonary/Chest: No respiratory distress. Breath sounds are normal. No decreased breath sounds. Chest wall is stable. Dorsalis pulses noted bilaterally via US. Increased capillary refill to the BLE.  Abdominal: Soft and non-distended. Non-tender.   Back: No abrasions or ecchymosis. No midline bony tenderness to  the T-spine or L-spine. No deformities or step-offs.   Musculoskeletal: Full range of motion in bilateral extremities. No obvious deformities. L lateral hip TTP with DROM secondary to pain.  Skin: Normal color. No cyanosis. No lacerations. No abrasions   Neurological: Awake and alert. Appropriate for age. GCS 15. Normal speech. Motor strength is normal at 5/5 bilaterally. Non-focal neurological examination.      ED Course    Procedures  ED Vital Signs:  Vitals:    02/18/19 1031 02/18/19 1047 02/18/19 1101 02/18/19 1117   BP: (!) 96/42 (!) 106/49 (!) 106/53 (!) 113/51   Pulse: 77 65 60 66   Resp: 20 16 (!) 21 17   Temp:       TempSrc:       SpO2: 100% 100% 100% 100%   Weight:       Height:        02/18/19 1120 02/18/19 1220 02/18/19 1226 02/18/19 1228   BP:   (!) 71/30 (!) 81/37   Pulse:  62 61 60   Resp:  20 20 19   Temp:       TempSrc:       SpO2: 100% 99% 100% 99%   Weight:       Height:        02/18/19 1231 02/18/19 1246 02/18/19 1332 02/18/19 1431   BP: (!) 94/42 (!) 88/47 (!) 88/46 (!) 85/41   Pulse: 60 60 66 61   Resp: 16 18 (!) 23 (!) 23   Temp:       TempSrc:       SpO2: 100% 100% 100% 100%   Weight:       Height:        02/18/19 1531 02/18/19 1601 02/18/19 1634   BP: (!) 97/53 (!) 85/46    Pulse: 66 61    Resp: 18 (!) 22    Temp:   97.6 °F (36.4 °C)   TempSrc:   Oral   SpO2: 100% 100%    Weight:      Height:          Abnormal Lab Results:  Labs Reviewed   CBC W/ AUTO DIFFERENTIAL - Abnormal; Notable for the following components:       Result Value    RBC 3.37 (*)     Hemoglobin 10.0 (*)     Hematocrit 30.7 (*)     RDW 16.0 (*)     Gran # (ANC) 11.1 (*)     Lymph # 0.5 (*)     Gran% 89.2 (*)     Lymph% 4.0 (*)     All other components within normal limits   COMPREHENSIVE METABOLIC PANEL - Abnormal; Notable for the following components:    Sodium 135 (*)     CO2 19 (*)     Glucose 143 (*)     BUN, Bld 43 (*)     Creatinine 1.7 (*)     Calcium 7.3 (*)     Total Protein 4.1 (*)     Albumin 1.8 (*)     eGFR if   32 (*)     eGFR if non  28 (*)     All other components within normal limits   URINALYSIS, REFLEX TO URINE CULTURE - Abnormal; Notable for the following components:    Ketones, UA Trace (*)     Bilirubin (UA) 1+ (*)     Leukocytes, UA Trace (*)     All other components within normal limits    Narrative:     Preferred Collection Type->Urine, Clean Catch   PROTIME-INR - Abnormal; Notable for the following components:    Prothrombin Time 13.7 (*)     INR 1.3 (*)     All other components within normal limits   APTT - Abnormal; Notable for the following components:    aPTT 43.2 (*)     All other components within normal limits   B-TYPE NATRIURETIC PEPTIDE - Abnormal; Notable for the following components:     (*)     All other components within normal limits   PROCALCITONIN - Abnormal; Notable for the following components:    Procalcitonin 0.61 (*)     All other components within normal limits   URINALYSIS MICROSCOPIC - Abnormal; Notable for the following components:    WBC, UA 50 (*)     WBC Clumps, UA Many (*)     Bacteria, UA Many (*)     All other components within normal limits    Narrative:     Preferred Collection Type->Urine, Clean Catch   POCT GLUCOSE - Abnormal; Notable for the following components:    POCT Glucose 28 (*)     All other components within normal limits   POCT GLUCOSE - Abnormal; Notable for the following components:    POCT Glucose 164 (*)     All other components within normal limits   POCT GLUCOSE - Abnormal; Notable for the following components:    POCT Glucose 134 (*)     All other components within normal limits   INFLUENZA A & B BY MOLECULAR   CULTURE, BLOOD   CULTURE, BLOOD   CULTURE, URINE   LACTIC ACID, PLASMA   MAGNESIUM   TROPONIN I   LACTIC ACID, PLASMA        All Lab Results:  Results for orders placed or performed during the hospital encounter of 02/18/19   Influenza A & B by Molecular   Result Value Ref Range    Influenza A, Molecular Negative  Negative    Influenza B, Molecular Negative Negative    Flu A & B Source NP    CBC auto differential   Result Value Ref Range    WBC 12.52 3.90 - 12.70 K/uL    RBC 3.37 (L) 4.00 - 5.40 M/uL    Hemoglobin 10.0 (L) 12.0 - 16.0 g/dL    Hematocrit 30.7 (L) 37.0 - 48.5 %    MCV 91 82 - 98 fL    MCH 29.7 27.0 - 31.0 pg    MCHC 32.6 32.0 - 36.0 g/dL    RDW 16.0 (H) 11.5 - 14.5 %    Platelets 156 150 - 350 K/uL    MPV 10.5 9.2 - 12.9 fL    Gran # (ANC) 11.1 (H) 1.8 - 7.7 K/uL    Lymph # 0.5 (L) 1.0 - 4.8 K/uL    Mono # 0.9 0.3 - 1.0 K/uL    Eos # 0.0 0.0 - 0.5 K/uL    Baso # 0.02 0.00 - 0.20 K/uL    Gran% 89.2 (H) 38.0 - 73.0 %    Lymph% 4.0 (L) 18.0 - 48.0 %    Mono% 7.1 4.0 - 15.0 %    Eosinophil% 0.1 0.0 - 8.0 %    Basophil% 0.2 0.0 - 1.9 %    Poik Slight     Ovalocytes Occasional     Tear Drop Cells Occasional     Schistocytes Present     Differential Method Automated    Comprehensive metabolic panel   Result Value Ref Range    Sodium 135 (L) 136 - 145 mmol/L    Potassium 4.1 3.5 - 5.1 mmol/L    Chloride 106 95 - 110 mmol/L    CO2 19 (L) 23 - 29 mmol/L    Glucose 143 (H) 70 - 110 mg/dL    BUN, Bld 43 (H) 8 - 23 mg/dL    Creatinine 1.7 (H) 0.5 - 1.4 mg/dL    Calcium 7.3 (L) 8.7 - 10.5 mg/dL    Total Protein 4.1 (L) 6.0 - 8.4 g/dL    Albumin 1.8 (L) 3.5 - 5.2 g/dL    Total Bilirubin 0.6 0.1 - 1.0 mg/dL    Alkaline Phosphatase 110 55 - 135 U/L    AST 28 10 - 40 U/L    ALT 12 10 - 44 U/L    Anion Gap 10 8 - 16 mmol/L    eGFR if African American 32 (A) >60 mL/min/1.73 m^2    eGFR if non African American 28 (A) >60 mL/min/1.73 m^2   Lactic acid, plasma #1   Result Value Ref Range    Lactate (Lactic Acid) 2.0 0.5 - 2.2 mmol/L   Urinalysis, Reflex to Urine Culture Urine, Clean Catch   Result Value Ref Range    Specimen UA Urine, Clean Catch     Color, UA Yellow Yellow, Straw, Sarah    Appearance, UA Clear Clear    pH, UA 6.0 5.0 - 8.0    Specific Gravity, UA 1.020 1.005 - 1.030    Protein, UA Negative Negative    Glucose, UA  Negative Negative    Ketones, UA Trace (A) Negative    Bilirubin (UA) 1+ (A) Negative    Occult Blood UA Negative Negative    Nitrite, UA Negative Negative    Urobilinogen, UA Negative <2.0 EU/dL    Leukocytes, UA Trace (A) Negative   Magnesium   Result Value Ref Range    Magnesium 1.6 1.6 - 2.6 mg/dL   Protime-INR   Result Value Ref Range    Prothrombin Time 13.7 (H) 9.0 - 12.5 sec    INR 1.3 (H) 0.8 - 1.2   APTT   Result Value Ref Range    aPTT 43.2 (H) 21.0 - 32.0 sec   Brain natriuretic peptide   Result Value Ref Range     (H) 0 - 99 pg/mL   Troponin I   Result Value Ref Range    Troponin I 0.020 0.000 - 0.026 ng/mL   Lactic acid, plasma #2   Result Value Ref Range    Lactate (Lactic Acid) 0.9 0.5 - 2.2 mmol/L   Procalcitonin   Result Value Ref Range    Procalcitonin 0.61 (H) <0.25 ng/mL   Urinalysis Microscopic   Result Value Ref Range    WBC, UA 50 (H) 0 - 5 /hpf    WBC Clumps, UA Many (A) None-Rare    Bacteria, UA Many (A) None-Occ /hpf    Microscopic Comment SEE COMMENT    POCT glucose   Result Value Ref Range    POCT Glucose 28 (LL) 70 - 110 mg/dL   POCT glucose   Result Value Ref Range    POCT Glucose 164 (H) 70 - 110 mg/dL   POCT glucose   Result Value Ref Range    POCT Glucose 134 (H) 70 - 110 mg/dL       Imaging Results:  Imaging Results          CT Hip Without Contrast Left (Final result)  Result time 02/18/19 15:40:34    Final result by NAG Rowe Sr., MD (02/18/19 15:40:34)                 Impression:      1. There is an acute fracture through the greater trochanter of the left femur.  2. There is grade 1 anterolisthesis of S5 of the sacrum on C1 of the coccyx.  3. There is a moderate amount of edema in the visualized portion of the pelvis and lower extremities.  4. There is a moderate amount of atherosclerosis.  All CT scans at this facility use dose modulation, iterative reconstruction, and/or weight base dosing when appropriate to reduce radiation dose when appropriate to reduce  radiation dose to as low as reasonably achievable.      Electronically signed by: Tin Rowe MD  Date:    02/18/2019  Time:    15:40             Narrative:    EXAMINATION:  CT HIP WITHOUT CONTRAST LEFT    CLINICAL HISTORY:  left trochanteric fracture;    TECHNIQUE:  Standard hip CT protocol without IV contrast material was performed.  Coronal and sagittal reformats were obtained.    COMPARISON:  Plain film examination of the left hip performed on 02/18/2019.    FINDINGS:  There is an acute fracture through the greater trochanter of the left femur.  There is no dislocation.  There is a moderate amount of atherosclerosis.  There is grade 1 anterolisthesis of S5 of the sacrum on C1 of the coccyx.  There is a moderate amount of edema in the visualized portion of the pelvis and lower extremities.                               X-Ray Chest AP Portable (Final result)  Result time 02/18/19 11:01:52    Final result by ANG Rowe Sr., MD (02/18/19 11:01:52)                 Impression:      1. There is no focal pulmonary infiltrate visualized.  2. The size of the heart is prominent.  This may be secondary to magnification.  3. Surgical changes  .      Electronically signed by: Tin Rowe MD  Date:    02/18/2019  Time:    11:01             Narrative:    EXAMINATION:  XR CHEST AP PORTABLE    CLINICAL HISTORY:  Sepsis;    COMPARISON:  None    FINDINGS:  There are multiple sternotomy wires in place.  There are multiple surgical clips projected over the mediastinum.  A cardiac defibrillator is in place.  The size of the heart is prominent.  There is no focal pulmonary infiltrate visualized.  There is no pneumothorax.  The costophrenic angles are sharp.                               X-Ray Hip 2 View Left (Final result)  Result time 02/18/19 11:03:54    Final result by ANG Rwoe Sr., MD (02/18/19 11:03:54)                 Impression:      1. There is a poorly visualized fracture involving the greater trochanter  of the left femur.  2. There is a marked amount of atherosclerosis. There is a vascular stent in the expected location of the right external iliac artery.      Electronically signed by: Tin Rowe MD  Date:    02/18/2019  Time:    11:03             Narrative:    EXAMINATION:  XR HIP 2 VIEW LEFT    CLINICAL HISTORY:  fall;    COMPARISON:  None    FINDINGS:  There is a poorly visualized fracture involving the greater trochanter of the left femur.  There is no dislocation.  There is a marked amount of atherosclerosis.  There is a vascular stent in the expected location of the right external iliac artery.                            CT Angio Chest Abdomen Pelvis W Contrast (02/12/2019 6:04 PM CST)  CT Angio Chest Abdomen Pelvis W Contrast (02/12/2019 6:04 PM CST)   Narrative Performed At   REASON FOR EXAM: diffuse pain of chest, abd and pelvis; hx of AAA and significant stenosis to branches of aorta r/o dissection/worsening stenosis         TECHNICAL FACTORS: Intravenous contrast images are obtained of the chest, abdomen, and pelvis with image postprocessing, including maximum intensity projection (MIP) reconstructions. Nonintravenous contrast  images were also obtained. Images are     stored in the patient's permanent record. Automated exposure control was utilized for radiation dose reduction.         COMPARISON: CT abdomen pelvis with contrast 03/03/2017        CHEST FINDINGS: The thoracic aorta is normal in caliber without evidence of aneurysm or dissection.  There is calcified plaque along the posterior wall of the left subclavian artery at the thoracic outlet which appears to be producing hemodynamically     significant stenosis between the anterior aspect of the left first rib and the posterior aspect of the left clavicle. This lies in close proximity to a pacemaker lead entering the left subclavian vein.. There also appears to be hemodynamically     significant stenosis of the right subclavian artery at  the level of the thoracic outlet between the right first rib and right clavicle. CABG is noted with a patent graft seen extending from the anterior aspect of the ascending aorta to the distal left     circumflex artery. The left circumflex artery is poorly visualized. Calcifications are noted in the proximal left circumflex and left anterior descending coronary arteries. The left anterior descending coronary artery is poorly visualized. The right     coronary artery is poorly visualized. Pacemaker leads are seen extending into the base of the right ventricle. Calcification of the mitral valve annulus is noted. Both the ascending and descending thoracic aorta measure 3.0 cm in maximum diameter. Mild,     diffuse centrilobular emphysema is noted.        ABDOMEN FINDINGS: Extensive atherosclerotic calcifications are noted in the abdominal aorta. Fusiform dilatation of the abdominal aorta is essentially unchanged from the previous study. At the level of the diaphragm, the thoracoabdominal aorta measures     3.6 cm in maximum diameter. Just above the celiac artery, the abdominal aorta measures 3.1 cm in maximum diameter. The level of the superior mesenteric artery, the abdominal aorta measures 2.6 cm in maximum diameter. The infrarenal abdominal aorta     measures 2.7 cm in maximum diameter just above the aortic bifurcation. There is extensive atherosclerosis probably significant stenosis in the poorly opacified splenic artery. The celiac artery, hepatic arteries and gastroduodenal artery are mostly     unremarkable. Extensive calcified plaque is noted in the superior mesenteric artery, and there is probably hemodynamically significant stenosis in the proximal superior mesenteric artery distal to the ostium. Distal branches of the superior mesenteric     artery generally appear well opacified. The inferior mesenteric artery is not visualized and is presumably occluded. Extensive atherosclerosis is noted in the renal  arteries, especially on the left side, but there is no focal stenosis identified within     the main renal arteries. There are multiple mildly dilated small bowel loops with scattered air-fluid levels noted. There is no specific evidence of intestinal ischemia such as pneumatosis intestinalis or air in the mesenteric venous system appreciated.     There is trace air in the left hepatic vein of doubtful significance likely related to intravenous access. Cholecystectomy is noted. The duodenal bulb is moderately distended. A large duodenal diverticulum is noted along the medial aspect of the duodenum     present but less pronounced on the previous CT of 2017. Duodenal ulceration cannot be excluded.         PELVIS FINDINGS: The common iliac, external iliac, and internal iliac arteries are patent without evidence of significant stenosis.  The common femoral arteries are patent without evidence of significant stenosis.  A patent stent is noted extending from     the right external iliac artery into the right common femoral artery. Hysterectomy is noted.        IMPRESSION:    1.  Extensive atherosclerosis with diffuse ectasia of the thoracic and abdominal aorta with a maximum diameter of 3.6 cm noted at the level of the diaphragm.    2.  CABG is noted with a patent graft seen arising from the anterior aspect of the ascending aorta, but there is poor opacification of coronary arteries which appear severely, diffusely diseased.     3. There appears to be hemodynamically significant stenosis of both subclavian arteries at the thoracic outlet on both sides. On the left side, evaluation is somewhat limited by the densely calcified plaque in close proximity to a transvenous pacemaker     lead. Correlation with brachial artery pressures might be helpful.    4. Mild cardiomegaly.    5. Extensive plaque is noted in the proximal superior mesenteric artery, and there is probably hemodynamically significant stenosis in the superior  mesenteric artery just distal to the ostium.    6. The inferior mesenteric artery is not visualized and is presumably occluded.    7. Mild, nonspecific small bowel ileus.    8. Large duodenal diverticulum. Duodenal ulceration cannot be excluded.    9. Additional findings discussed above.                  Electronically signed by Tin Vaca MD on 2/12/2019 6:54 PM        The EKG was ordered, reviewed, and independently interpreted by the ED provider.  Interpretation time: 9:31 AM  Rate: 70 BPM  Rhythm: ventricular paced rhythm  Interpretation: No acute ST changes. No STEMI.                  The Emergency Provider reviewed the vital signs and test results, which are outlined above.    ED Discussion     12:31 PM: Re-evaluated pt. Pt is resting comfortably and is in no acute distress.  Pt states she is a DNR.  D/w pt all pertinent results. D/w pt any concerns expressed at this time. Answered all questions. Pt expresses understanding at this time.    1:03 PM: Dr. Blackman (Vascular Surgery) is at the pt bedside.    1:08 PM: Dr. Polk discussed the pt's case with Dr. Blackman (Vascular Surgery) who does not recommend any intervention at this time.    1:27 PM: Dr. Polk discussed the pt's case with Dr. Parnell (Orthopedics) who recommends getting a CT scan with thin slices of the pt's L hip.    3:55 PM: Dr. Polk discussed the pt's case with Dr. Parnell (Ortho) who states fracture is nonsurgical and recommends f/u in clinic in 1 week.    5:32 PM: Discussed case with JOSIE Hastings (Hospital Medicine) who agrees with current care and management of pt and accepts admission.   Admitting Service: Hospital medicine   Admitting Physician: Dr. Carl   Admit to: Inpatient    5:40 PM: Re-evaluated pt. I have discussed test results, shared treatment plan, and the need for admission with patient. Pt expresses understanding at this time and agree with all information. All questions answered. Pt has no further questions or  concerns at this time. Pt is ready for admit.      ED Medication(s):  Medications   vancomycin 750 mg in dextrose 5 % 250 mL IVPB (ready to mix system)PLACE ORELLANA DOSE ONLY-NOT TO BE GIVEN (not administered)   0.9%  NaCl infusion (not administered)   dextrose 50% (D50W) solution 25 g (25 g Intravenous New Bag 2/18/19 0948)   sodium chloride 0.9% bolus 1,428 mL (0 mLs Intravenous Stopped 2/18/19 1238)   piperacillin-tazobactam 4.5 g in dextrose 5 % 100 mL IVPB (ready to mix system) (0 g Intravenous Stopped 2/18/19 1150)   vancomycin in dextrose 5 % 1 gram/250 mL IVPB 1,000 mg (1,000 mg Intravenous Random Level Due 2/19/19 0430)   morphine injection 4 mg (4 mg Intravenous Given 2/18/19 1154)          Medication List      ASK your doctor about these medications    aspirin 81 MG EC tablet  Commonly known as:  ECOTRIN     digoxin 125 mcg tablet  Commonly known as:  LANOXIN     ELIQUIS 5 mg Tab  Generic drug:  apixaban     levothyroxine 100 MCG tablet  Commonly known as:  SYNTHROID     memantine 10 MG Tab  Commonly known as:  NAMENDA     metoprolol succinate 100 MG 24 hr tablet  Commonly known as:  TOPROL-XL     oxybutynin 5 MG Tab  Commonly known as:  DITROPAN                  Medical Decision Making    Medical Decision Making:   Clinical Tests:   Lab Tests: Ordered and Reviewed  Radiological Study: Ordered and Reviewed  Medical Tests: Ordered and Reviewed           Scribe Attestation:   Scribe #1: I performed the above scribed service and the documentation accurately describes the services I performed. I attest to the accuracy of the note 02/18/2019 .    Attending:   Physician Attestation Statement for Scribe #1: I, Regine Polk MD, personally performed the services described in this documentation, as scribed by Corinne Mack, in my presence, and it is both accurate and complete.          Clinical Impression       ICD-10-CM ICD-9-CM   1. Closed fracture of left hip, initial encounter S72.002A 820.8   2. Fall  W19.XXXA E888.9   3. Hypotension I95.9 458.9       Disposition:   Disposition: Admitted  Condition: Fair           Regine Polk MD  02/19/19 0633

## 2019-02-18 NOTE — PROGRESS NOTES
Bernarda Mathis [id number] is a 83 y.o. female who has been consulted for vancomycin dosing for Sepsis    The patient has the following labs:     Date Creatinine (mg/dl)    BUN WBC Count   2/18/2019 Estimated Creatinine Clearance: 19.5 mL/min (A) (based on SCr of 1.7 mg/dL (H)). Lab Results   Component Value Date    BUN 43 (H) 02/18/2019     Lab Results   Component Value Date    WBC 12.52 02/18/2019      which calculates to an Estimated Creatinine Clearance: 19.5 mL/min (A) (based on SCr of 1.7 mg/dL (H))..       Current weight is 47.6 kg (105 lb)    The patient will be started on vancomycin Pulse Dosing with 750 mg iv q72hrs as a place randall dose only.    Patient will not receive another dose unless random level is   <= 18 mcg/ml    A vancomycin random is ordered for 2/19 @ 0430.    Patient will be followed by pharmacy for changes in renal function, toxicity, and efficacy.      Thank you for allowing us to participate in this patient's care.     Ryanne Chaidez [facility]

## 2019-02-18 NOTE — CONSULTS
Vasc/VSC    Case d/w ED staff.  84 y/o female with multiple medical conditions.  Recently admitted at [facility] with some abdominal pain.  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  Pt and family left AMA.  [place] [facility] [place] with left hip pain, found to have left hip FX.  Currently denies abd pain.    Rec: no obvious acute vascular issues at this point with no further abd pain/n/v.  Likely has significant chronic vascular disease with multiple vascular bed involvement.  Pt may f/u with cardiologist as outpt or with VSC as needed.

## 2019-02-18 NOTE — SUBJECTIVE & OBJECTIVE
Past Medical History:   Diagnosis Date    Breast cancer     Coronary artery disease     Thyroid disease     Urinary frequency        Past Surgical History:   Procedure Laterality Date    APPENDECTOMY      BILATERAL MASTECTOMY      BLADDER SUSPENSION      BREAST SURGERY      CARDIAC SURGERY      CHOLECYSTECTOMY      CORONARY ARTERY BYPASS GRAFT      ILIAC ARTERY STENT      INSERTION OF PACEMAKER         Review of patient's allergies indicates:  No Known Allergies    No current facility-administered medications on file prior to encounter.      Current Outpatient Medications on File Prior to Encounter   Medication Sig    apixaban (ELIQUIS) 5 mg Tab Take 5 mg by mouth 2 (two) times daily.    aspirin (ECOTRIN) 81 MG EC tablet Take 81 mg by mouth once daily.    digoxin (LANOXIN) 125 mcg tablet Take 125 mcg by mouth once daily.    levothyroxine (SYNTHROID) 100 MCG tablet Take 100 mcg by mouth once daily.    memantine (NAMENDA) 10 MG Tab Take 5 mg by mouth 2 (two) times daily.    metoprolol succinate (TOPROL-XL) 100 MG 24 hr tablet Take 200 mg by mouth once daily.    oxybutynin (DITROPAN) 5 MG Tab Take 5 mg by mouth 2 (two) times daily.     Family History     None        Tobacco Use    Smoking status: Former Smoker     Types: Cigarettes     Last attempt to quit: 2000     Years since quittin.1    Smokeless tobacco: Never Used   Substance and Sexual Activity    Alcohol use: No     Frequency: Never    Drug use: No    Sexual activity: Not on file     Review of Systems   Reason unable to perform ROS: patient is weak , has pain at site of fracture    Constitutional: Negative for chills and fatigue.   HENT: Negative for congestion, ear pain, facial swelling, sinus pressure and sore throat.    Eyes: Negative for pain.   Respiratory: Negative for apnea, chest tightness, shortness of breath and stridor.    Cardiovascular: Negative for chest pain, palpitations and leg swelling.   Gastrointestinal:  Negative for abdominal distention, abdominal pain, diarrhea and nausea.   Endocrine: Negative for polydipsia and polyphagia.   Genitourinary: Negative for decreased urine volume, difficulty urinating, frequency and genital sores.   Musculoskeletal: Negative for arthralgias and gait problem.        Pain at site of recent left hip fracture    Neurological: Negative for light-headedness and headaches.   Hematological: Negative for adenopathy.   Psychiatric/Behavioral: Negative for agitation, confusion and decreased concentration.     Objective:     Vital Signs (Most Recent):  Temp: 97.6 °F (36.4 °C) (02/18/19 1634)  Pulse: 61 (02/18/19 1601)  Resp: (!) 22 (02/18/19 1601)  BP: (!) 85/46 (02/18/19 1601)  SpO2: 100 % (02/18/19 1601) Vital Signs (24h Range):  Temp:  [95.2 °F (35.1 °C)-97.6 °F (36.4 °C)] 97.6 °F (36.4 °C)  Pulse:  [60-77] 61  Resp:  [14-23] 22  SpO2:  [99 %-100 %] 100 %  BP: ()/(30-53) 85/46     Weight: 47.6 kg (105 lb)  Body mass index is 16.95 kg/m².    Physical Exam   Constitutional: She is oriented to person, place, and time. Vital signs are normal. She appears well-developed.   Appears weak   HENT:   Head: Normocephalic and atraumatic.   Eyes: Conjunctivae and EOM are normal. Pupils are equal, round, and reactive to light.   Neck: Normal range of motion. Neck supple. No thyroid mass and no thyromegaly present.   Cardiovascular: Normal rate, normal heart sounds and intact distal pulses.   Pulmonary/Chest: Effort normal and breath sounds normal. No accessory muscle usage. No respiratory distress.   Abdominal: Soft. Bowel sounds are normal. She exhibits no mass. There is no tenderness.   Musculoskeletal: Normal range of motion.   Pain over left hip    Neurological: She is alert and oriented to person, place, and time.   Skin: Skin is intact. No rash noted.   Psychiatric: She has a normal mood and affect.   Nursing note and vitals reviewed.        CRANIAL NERVES     CN III, IV, VI   Pupils are equal,  round, and reactive to light.  Extraocular motions are normal.        Significant Labs:   BMP:   Recent Labs   Lab 02/18/19  0937   *   *   K 4.1      CO2 19*   BUN 43*   CREATININE 1.7*   CALCIUM 7.3*   MG 1.6     All pertinent labs within the past 24 hours have been reviewed.    Significant Imaging: I have reviewed and interpreted all pertinent imaging results/findings within the past 24 hours.

## 2019-02-19 PROBLEM — F03.90 DEMENTIA: Status: ACTIVE | Noted: 2018-01-01

## 2019-02-19 PROBLEM — N39.0 UTI (URINARY TRACT INFECTION): Status: ACTIVE | Noted: 2019-01-01

## 2019-02-19 NOTE — PROGRESS NOTES
"[facility] Hospital Medicine  Progress Note    Patient Name: Bernarda Mathis  MRN: [id number]  Patient Class: IP- Inpatient   Admission Date: 2/18/2019  Length of Stay: 1 days  Attending Physician: German Carl MD  Primary Care Provider: Provider [employer]        Subjective:     Principal Problem:Closed fracture of left hip    HPI:  83 y.o. female patient  with history of extensive atherosclerosis with diffuse ectasia of the thoracic and abdominal aorta ,atrial fibrillation on anticoagulation,CAD s/p CABG  Who recently left AMA from [facility] . She was admitted  at that time for 5 days and was seen by CVT -Dr Cifuentes at [place]. 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   On 2/15 she had Aortogram with selective celiac and superior mesenteric artery angiography and conscious moderate sedation. 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  Multiple attempts with different guides and wires were used to enter the superior mesenteric artery ostium, but these were ultimately unsuccessful.  Selective celiac artery angiography did not reveal any apparent significant stenosis"  The patient subsequently left AMA.    She presented at this time with history of left hip pain following a fall at home.CT scan of the hip showed - acute fracture through the greater trochanter of the left femur.  She was seen in the ED and at this time, her major compliant is pain at the site of fracture . She is now DNR              Interval History: Patient admitted to IP for closed fx of L hip under the care of Hospital Medicine. Ortho was consulted and states fx is nonsurgical and recommends f/u in clinic in 1 week. Vascular was consulted who stated no obvious acute vascular issues at this point. Likely has significant chronic " vascular disease and pt my f/u with cardiologist as OP or with VSC as needed. PT/OT consutled. Hypotensive and receiving IVF's. Midodrine ordered.      Review of Systems   Reason unable to perform ROS: patient is weak , has pain at site of fracture    Constitutional: Negative for chills and fatigue.   HENT: Negative for congestion, ear pain, facial swelling, sinus pressure and sore throat.    Eyes: Negative for pain.   Respiratory: Negative for apnea, chest tightness, shortness of breath and stridor.    Cardiovascular: Negative for chest pain, palpitations and leg swelling.   Gastrointestinal: Negative for abdominal distention, abdominal pain, diarrhea and nausea.   Endocrine: Negative for polydipsia and polyphagia.   Genitourinary: Negative for decreased urine volume, difficulty urinating, frequency and genital sores.   Musculoskeletal: Negative for arthralgias and gait problem.        Pain at site of recent left hip fracture    Neurological: Negative for light-headedness and headaches.   Hematological: Negative for adenopathy.   Psychiatric/Behavioral: Negative for agitation, confusion and decreased concentration.     Objective:     Vital Signs (Most Recent):  Temp: 97.8 °F (36.6 °C) (02/19/19 1110)  Pulse: 89 (02/19/19 1110)  Resp: 17 (02/19/19 1110)  BP: (!) 90/51 (02/19/19 1110)  SpO2: 97 % (02/19/19 1110) Vital Signs (24h Range):  Temp:  [96.8 °F (36 °C)-98.3 °F (36.8 °C)] 97.8 °F (36.6 °C)  Pulse:  [60-89] 89  Resp:  [16-23] 17  SpO2:  [79 %-100 %] 97 %  BP: ()/(30-61) 90/51     Weight: 47.6 kg (104 lb 15 oz)  Body mass index is 16.94 kg/m².    Intake/Output Summary (Last 24 hours) at 2/19/2019 1149  Last data filed at 2/19/2019 0800  Gross per 24 hour   Intake 1500 ml   Output --   Net 1500 ml      Physical Exam   Constitutional: She is oriented to person, place, and time. Vital signs are normal. She appears well-developed. She appears distressed (in pain).   Appears weak   HENT:   Head: Normocephalic  and atraumatic.   Nose: Nose normal.   Eyes: Conjunctivae and EOM are normal. Pupils are equal, round, and reactive to light. No scleral icterus.   Neck: Normal range of motion. Neck supple. No JVD present. No thyroid mass and no thyromegaly present.   Cardiovascular: Normal rate, regular rhythm, normal heart sounds and intact distal pulses. Exam reveals no gallop and no friction rub.   No murmur heard.  Pulmonary/Chest: Effort normal and breath sounds normal. No accessory muscle usage. No respiratory distress. She has no wheezes. She exhibits no tenderness.   Abdominal: Soft. Bowel sounds are normal. She exhibits no mass. There is no tenderness.   Musculoskeletal: Normal range of motion. She exhibits no edema.   Pain over left hip    Neurological: She is alert and oriented to person, place, and time.   Skin: Skin is warm, dry and intact. Capillary refill takes 2 to 3 seconds. No rash noted. No erythema.   Psychiatric: She has a normal mood and affect.   Nursing note and vitals reviewed.      Significant Labs:   Recent Lab Results       02/19/19  0412   02/18/19  1815   02/18/19  1740   02/18/19  1632   02/18/19  1428        Estimated Avg Glucose   97           Hemoglobin A1C External   5.0  Comment:  ADA Screening Guidelines:  5.7-6.4%  Consistent with prediabetes  >or=6.5%  Consistent with diabetes  High levels of fetal hemoglobin interfere with the HbA1C  assay. Heterozygous hemoglobin variants (HbS, HgC, etc)do  not significantly interfere with this assay.   However, presence of multiple variants may affect accuracy.             Lactate, Alf         0.9  Comment:  Falsely low lactic acid results can be found in samples   containing >=13.0 mg/dL total bilirubin and/or >=3.5 mg/dL   direct bilirubin.       POCT Glucose     109 134       Vancomycin, Random 11.0                                All pertinent labs within the past 24 hours have been reviewed.    Significant Imaging: I have reviewed all pertinent imaging  results/findings within the past 24 hours.    Assessment/Plan:      * Closed fracture of left hip    --ortho consulted     UTI (urinary tract infection)    --Rocephin 1gm daily X 3  --follow cx     Dementia    --continue namenda     Hypothyroidism      Will continue synthroid, check TSH      Essential hypertension    --hypotensive today  --midodrine ordered     CKD (chronic kidney disease) stage 3, GFR 30-59 ml/min    --avoid nephrotoxic agents  --monitor       Atrial fibrillation    --continue dig, metoprolol, and eliquis  --hold metoprolol if hypotensive       Aortoiliac occlusive disease    --Previous history from [place] reviewed- she has recent failed attempt by CVT on 2/15 to cross the superior mesenteric ostium during aortogram with selective celiac and superior mesenteric artery angiography .  --Vascular saw in ED - no acute intervention planned         VTE Risk Mitigation (From admission, onward)        Ordered     apixaban tablet 5 mg  2 times daily      02/18/19 2138     IP VTE HIGH RISK PATIENT  Once      02/18/19 2138     Reason for No Pharmacological VTE Prophylaxis  Once      02/18/19 2138              SANDRA Sullivan  Department of Hospital Medicine   [facility]

## 2019-02-19 NOTE — HPI
"83 y.o. female patient with history of extensive atherosclerosis with diffuse ectasia of the thoracic and abdominal aorta ,atrial fibrillation on anticoagulation,CAD s/p CABG  Who recently left AMA from [facility] . She was admitted  at that time for 5 days and was seen by CVT -Dr Cifuentes at [place]. 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   On 2/15 she had Aortogram with selective celiac and superior mesenteric artery angiography and conscious moderate sedation. 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  Multiple attempts with different guides and wires were used to enter the superior mesenteric artery ostium, but these were ultimately unsuccessful.  Selective celiac artery angiography did not reveal any apparent significant stenosis"  The patient subsequently left AMA.    She presented at this time with history of left hip pain following a fall at home.CT scan of the hip showed - acute fracture through the greater trochanter of the left femur.  She was seen in the ED and at this time, her major compliant is pain at the site of fracture . She is now DNR            "

## 2019-02-19 NOTE — SUBJECTIVE & OBJECTIVE
Interval History: Patient admitted to  for closed fx of L hip under the care of Hospital Medicine. Ortho was consulted and states fx is nonsurgical and recommends f/u in clinic in 1 week. Vascular was consulted who stated no obvious acute vascular issues at this point. Likely has significant chronic vascular disease and pt my f/u with cardiologist as OP or with VSC as needed. PT/OT consutled. Hypotensive and receiving IVF's. Midodrine ordered.      Review of Systems   Reason unable to perform ROS: patient is weak , has pain at site of fracture    Constitutional: Negative for chills and fatigue.   HENT: Negative for congestion, ear pain, facial swelling, sinus pressure and sore throat.    Eyes: Negative for pain.   Respiratory: Negative for apnea, chest tightness, shortness of breath and stridor.    Cardiovascular: Negative for chest pain, palpitations and leg swelling.   Gastrointestinal: Negative for abdominal distention, abdominal pain, diarrhea and nausea.   Endocrine: Negative for polydipsia and polyphagia.   Genitourinary: Negative for decreased urine volume, difficulty urinating, frequency and genital sores.   Musculoskeletal: Negative for arthralgias and gait problem.        Pain at site of recent left hip fracture    Neurological: Negative for light-headedness and headaches.   Hematological: Negative for adenopathy.   Psychiatric/Behavioral: Negative for agitation, confusion and decreased concentration.     Objective:     Vital Signs (Most Recent):  Temp: 97.8 °F (36.6 °C) (02/19/19 1110)  Pulse: 89 (02/19/19 1110)  Resp: 17 (02/19/19 1110)  BP: (!) 90/51 (02/19/19 1110)  SpO2: 97 % (02/19/19 1110) Vital Signs (24h Range):  Temp:  [96.8 °F (36 °C)-98.3 °F (36.8 °C)] 97.8 °F (36.6 °C)  Pulse:  [60-89] 89  Resp:  [16-23] 17  SpO2:  [79 %-100 %] 97 %  BP: ()/(30-61) 90/51     Weight: 47.6 kg (104 lb 15 oz)  Body mass index is 16.94 kg/m².    Intake/Output Summary (Last 24 hours) at 2/19/2019 1149  Last  data filed at 2/19/2019 0800  Gross per 24 hour   Intake 1500 ml   Output --   Net 1500 ml      Physical Exam   Constitutional: She is oriented to person, place, and time. Vital signs are normal. She appears well-developed. She appears distressed (in pain).   Appears weak   HENT:   Head: Normocephalic and atraumatic.   Nose: Nose normal.   Eyes: Conjunctivae and EOM are normal. Pupils are equal, round, and reactive to light. No scleral icterus.   Neck: Normal range of motion. Neck supple. No JVD present. No thyroid mass and no thyromegaly present.   Cardiovascular: Normal rate, regular rhythm, normal heart sounds and intact distal pulses. Exam reveals no gallop and no friction rub.   No murmur heard.  Pulmonary/Chest: Effort normal and breath sounds normal. No accessory muscle usage. No respiratory distress. She has no wheezes. She exhibits no tenderness.   Abdominal: Soft. Bowel sounds are normal. She exhibits no mass. There is no tenderness.   Musculoskeletal: Normal range of motion. She exhibits no edema.   Pain over left hip    Neurological: She is alert and oriented to person, place, and time.   Skin: Skin is warm, dry and intact. Capillary refill takes 2 to 3 seconds. No rash noted. No erythema.   Psychiatric: She has a normal mood and affect.   Nursing note and vitals reviewed.      Significant Labs:   Recent Lab Results       02/19/19  0412   02/18/19  1815   02/18/19  1740   02/18/19  1632   02/18/19  1428        Estimated Avg Glucose   97           Hemoglobin A1C External   5.0  Comment:  ADA Screening Guidelines:  5.7-6.4%  Consistent with prediabetes  >or=6.5%  Consistent with diabetes  High levels of fetal hemoglobin interfere with the HbA1C  assay. Heterozygous hemoglobin variants (HbS, HgC, etc)do  not significantly interfere with this assay.   However, presence of multiple variants may affect accuracy.             Lactate, Alf         0.9  Comment:  Falsely low lactic acid results can be found in  samples   containing >=13.0 mg/dL total bilirubin and/or >=3.5 mg/dL   direct bilirubin.       POCT Glucose     109 134       Vancomycin, Random 11.0                                All pertinent labs within the past 24 hours have been reviewed.    Significant Imaging: I have reviewed all pertinent imaging results/findings within the past 24 hours.

## 2019-02-19 NOTE — PT/OT/SLP PROGRESS
Physical Therapy      Patient Name:  Bernarda Mathis   MRN:  [id number]    NAYELI BROWN INITIATED THIS PM VIA CHART REVIEW, PT WITH HIP FX. AWAITING SX., WILL COMPLETE NAYELI BROWN FOLLOWING SX.    Regine Cummins, PT   2/19/2019  1210

## 2019-02-19 NOTE — ASSESSMENT & PLAN NOTE
Will restart eliquis if no orthopedic surgery intervention is planned. Will continue metoprolol and digoxin,telemetry monitoring

## 2019-02-19 NOTE — PLAN OF CARE
Met with patient, spouse, and son Reggie. Patient lives with her  and does not currently have any HME at home. Her sons are considering Home Health vs SNF. Son, Herminio, has worked in health care for many years and now works from home, so he would be available to stay home with patient if they choose HH. Explained MSW would give report to LYLE Cheney who will f/u.     02/19/19 1233   Discharge Assessment   Assessment Type Discharge Planning Assessment   Confirmed/corrected address and phone number on facesheet? Yes   Assessment information obtained from? Patient;Caregiver;Medical Record   Prior to hospitilization cognitive status: Alert/Oriented   Prior to hospitalization functional status: Needs Assistance   Current cognitive status: Alert/Oriented   Current Functional Status: Needs Assistance   Lives With spouse   Able to Return to Prior Arrangements other (see comments)   Is patient able to care for self after discharge? Unable to determine at this time (comments)   Who are your caregiver(s) and their phone number(s)? Katie Paredes [phone] and Herminio [phone]   Patient currently receives any other outside agency services? No   Equipment Currently Used at Home none   Do you have any problems affording any of your prescribed medications? No   Is the patient taking medications as prescribed? yes   Does the patient have transportation home? Yes   Transportation Anticipated family or friend will provide   Discharge Plan A Home with family;Home Health   Discharge Plan B Skilled Nursing Facility   DME Needed Upon Discharge  other (see comments)  (awaiting PT eval)   Patient/Family in Agreement with Plan yes

## 2019-02-19 NOTE — ASSESSMENT & PLAN NOTE
--Previous history from [place] reviewed- she has recent failed attempt by CVT on 2/15 to cross the superior mesenteric ostium during aortogram with selective celiac and superior mesenteric artery angiography .  --Vascular saw in ED - no acute intervention planned

## 2019-02-19 NOTE — ED NOTES
Spoke with Mae CAN on Telemetry to attempt to give report and bring patient to floor. Nurse is still in report and not able to take report at this time.

## 2019-02-19 NOTE — PROGRESS NOTES
Bernarda Mathis [id number] is a 83 y.o. female who has been consulted for vancomycin dosing for Urinary Tract Infection    The patient has the following labs:     Date Creatinine (mg/dl)    BUN WBC Count   2/19/2019 Estimated Creatinine Clearance: 15.1 mL/min (A) (based on SCr of 2.2 mg/dL (H)). Lab Results   Component Value Date    BUN 45 (H) 02/19/2019     Lab Results   Component Value Date    WBC 26.53 (H) 02/19/2019      which calculates to an Estimated Creatinine Clearance: 15.1 mL/min (A) (based on SCr of 2.2 mg/dL (H))..       Current weight is 47.6 kg (104 lb 15 oz)    The patient will be started on vancomycin pulse dosing.    A place randall dose for 750 mg iv q 72hrs will be placed and is not to be given.    Patient will receive a dose only when random level is < = 18     Patient will be followed by pharmacy for changes in renal function, toxicity, and efficacy.      The vancomycin random has been ordered for at 2/20 @ 9555    Thank you for allowing us to participate in this patient's care.     Ryanne Chaidez [facility]

## 2019-02-19 NOTE — ASSESSMENT & PLAN NOTE
Previous history from [place] reviewed- she has recent failed attempt by CVT on 2/15 to cross the superior mesenteric ostium during aortogram with selective celiac and superior mesenteric artery angiography .  She was seen by vascular surgery and no acute intervention is planned

## 2019-02-19 NOTE — PLAN OF CARE
Problem: Adult Inpatient Plan of Care  Goal: Patient-Specific Goal (Individualization)  Outcome: Ongoing (interventions implemented as appropriate)  Pt AAOX4  POC reviewed with pt bryant rodriguez.  NSR on tele monitor. Iv intact . NS infusing continuous @ 125 ml/hr.  Q2 turn. Ken intact. 2L oxygen nasal cannula.  Fall precautions in place.  Instructed to call for assistance.  Will continue to  monitor.

## 2019-02-19 NOTE — CONSULTS
[facility] -   Orthopedics  Consult Note    Patient Name: Bernarda Mathis  MRN: [id number]  Admission Date: 2/18/2019  Hospital Length of Stay: 1 days  Attending Provider: German Carl MD  Primary Care Provider: Provider Notinsystem    Patient information was obtained from patient and ER records.     Inpatient consult to Orthopedic Surgery  Consult performed by: Yevgeniy Parnell MD  Consult ordered by: MARY Arthur-ANNABEL        Subjective:     Principal Problem:Closed fracture of left hip    Chief Complaint:   Chief Complaint   Patient presents with    Fall     Pt fell at home and  is experiencing pain, numbness and tingling in the left hip and left leg        HPI: Bernarda Mathis is a 83 y.o. female patient who presents to the Emergency Department for L hip pain which onse yesterday morning. Pt left [place] AMA yesterday after being admitted for 5 days to Tx the pt's L iliac artery clot. The cath could not be passed through the clot and they were awaiting treatment when they became discouraged and left AMA. Pt fell while attempting to go to the bathroom this morning and is now c/o L hip pain. Symptoms are constant and moderate in severity. Pt's pain is worse with movement and palpation. No mitigating factors reported. No associated sxs reported. Patient denies any fever, chills, head injury, CP, SOB, N/V/D, abd pain, back pain, neck pain, HA, dizziness, syncope, and all other sxs at this time. No prior Tx reported. No further complaints or concerns at this time.         Past Medical History:   Diagnosis Date    Breast cancer     Coronary artery disease     Thyroid disease     Urinary frequency        Past Surgical History:   Procedure Laterality Date    APPENDECTOMY      BILATERAL MASTECTOMY      BLADDER SUSPENSION      BREAST SURGERY      CARDIAC SURGERY      CHOLECYSTECTOMY      CORONARY ARTERY BYPASS GRAFT      ILIAC ARTERY STENT      INSERTION OF PACEMAKER    "      Review of patient's allergies indicates:   Allergen Reactions    Chlorhexidine gluconate Other (See Comments)     " I had a bad reaction on my skin when I used this last."    Adhesive Rash       Current Facility-Administered Medications   Medication    0.9%  NaCl infusion    acetaminophen tablet 1,000 mg    acetaminophen tablet 650 mg    apixaban tablet 5 mg    aspirin EC tablet 81 mg    dextrose 50% injection 12.5 g    dextrose 50% injection 25 g    digoxin tablet 125 mcg    glucagon (human recombinant) injection 1 mg    glucose chewable tablet 16 g    glucose chewable tablet 24 g    levothyroxine tablet 100 mcg    memantine tablet 5 mg    meropenem-0.9% sodium chloride 500 mg/50 mL IVPB    metoprolol succinate (TOPROL-XL) 24 hr tablet 200 mg    ondansetron injection 4 mg    oxybutynin tablet 5 mg    rosuvastatin tablet 10 mg    sodium chloride 0.9% flush 5 mL    traMADol tablet 50 mg    [START ON 2019] vancomycin 750 mg in dextrose 5 % 250 mL IVPB (ready to mix system) PULSE DOSE ONLY -DO NOT GIVE     vancomycin 750 mg in dextrose 5 % 250 mL IVPB (ready to mix system)     Family History     None        Tobacco Use    Smoking status: Former Smoker     Types: Cigarettes     Last attempt to quit:      Years since quittin.1    Smokeless tobacco: Never Used   Substance and Sexual Activity    Alcohol use: No     Frequency: Never    Drug use: No    Sexual activity: Not on file     ROS   Reason unable to perform ROS: patient is weak , has pain at site of fracture    Constitutional: Negative for chills and fatigue.   HENT: Negative for congestion, ear pain, facial swelling, sinus pressure and sore throat.    Eyes: Negative for pain.   Respiratory: Negative for apnea, chest tightness, shortness of breath and stridor.    Cardiovascular: Negative for chest pain, palpitations and leg swelling.   Gastrointestinal: Negative for abdominal distention, abdominal pain, diarrhea and " "nausea.   Endocrine: Negative for polydipsia and polyphagia.   Genitourinary: Negative for decreased urine volume, difficulty urinating, frequency and genital sores.   Musculoskeletal: Negative for arthralgias and gait problem.        Pain at site of recent left hip fracture    Neurological: Negative for light-headedness and headaches.   Hematological: Negative for adenopathy.   Psychiatric/Behavioral: Negative for agitation, confusion and decreased concentration.       Objective:     Vital Signs (Most Recent):  Temp: 97.4 °F (36.3 °C) (02/19/19 1508)  Pulse: 86 (02/19/19 1525)  Resp: 17 (02/19/19 1508)  BP: (!) 92/50 (02/19/19 1508)  SpO2: 96 % (02/19/19 1508) Vital Signs (24h Range):  Temp:  [96.8 °F (36 °C)-98.3 °F (36.8 °C)] 97.4 °F (36.3 °C)  Pulse:  [60-89] 86  Resp:  [17-24] 17  SpO2:  [79 %-100 %] 96 %  BP: ()/(44-61) 92/50     Weight: 47.6 kg (104 lb 15 oz)  Height: 5' 6" (167.6 cm)  Body mass index is 16.94 kg/m².      Intake/Output Summary (Last 24 hours) at 2/19/2019 1637  Last data filed at 2/19/2019 1451  Gross per 24 hour   Intake 1680 ml   Output 300 ml   Net 1380 ml       General    Constitutional: No distress.   Eyes: EOM are normal. Right eye exhibits no discharge. Left eye exhibits no discharge.   Cardiovascular: Normal rate and intact distal pulses.    Pulmonary/Chest: Effort normal and breath sounds normal.   Abdominal: Soft.   Neurological: She is alert.   Responding to some questioning             Right Hip Exam     Tests   Log Roll: negative  Left Hip Exam     Tests   Log Roll: positive    Comments:  Tender to palpation over the greater trochanter  Endorses feeling of the distal foot  Ft and toe flexion extension intact            Vascular Exam       Capillary Refill  Left Hand: normal capillary refill      Significant Labs: All pertinent labs within the past 24 hours have been reviewed.    Significant Imaging: I have reviewed and interpreted all pertinent imaging results/findings. "   X-ray and CT scan left hip reviewed:   ANG Rowe Sr., MD 2/18/2019       Narrative     EXAMINATION:  CT HIP WITHOUT CONTRAST LEFT    CLINICAL HISTORY:  left trochanteric fracture;    TECHNIQUE:  Standard hip CT protocol without IV contrast material was performed.  Coronal and sagittal reformats were obtained.    COMPARISON:  Plain film examination of the left hip performed on 02/18/2019.    FINDINGS:  There is an acute fracture through the greater trochanter of the left femur.  There is no dislocation.  There is a moderate amount of atherosclerosis.  There is grade 1 anterolisthesis of S5 of the sacrum on C1 of the coccyx.  There is a moderate amount of edema in the visualized portion of the pelvis and lower extremities.      Impression       1. There is an acute fracture through the greater trochanter of the left femur.  2. There is grade 1 anterolisthesis of S5 of the sacrum on C1 of the coccyx.  3. There is a moderate amount of edema in the visualized portion of the pelvis and lower extremities.  4. There is a moderate amount of atherosclerosis.  All CT scans at this facility use dose modulation, iterative reconstruction, and/or weight base dosing when appropriate to reduce radiation dose when appropriate to reduce radiation dose to as low as reasonably achievable.      Electronically signed by: Tin Rowe MD  Date: 02/18/2019  Time: 15:40     ANG Rowe Sr., MD 2/18/2019       Narrative     EXAMINATION:  XR HIP 2 VIEW LEFT    CLINICAL HISTORY:  fall;    COMPARISON:  None    FINDINGS:  There is a poorly visualized fracture involving the greater trochanter of the left femur.  There is no dislocation.  There is a marked amount of atherosclerosis.  There is a vascular stent in the expected location of the right external iliac artery.      Impression       1. There is a poorly visualized fracture involving the greater trochanter of the left femur.  2. There is a marked amount of atherosclerosis.  There is a vascular stent in the expected location of the right external iliac artery.      Electronically signed by: Tin Rowe MD  Date: 02/18/2019  Time: 11:03           Assessment/Plan:     Active Diagnoses:    Diagnosis Date Noted POA    PRINCIPAL PROBLEM:  Closed fracture of left hip [S72.002A] 02/18/2019 Yes    UTI (urinary tract infection) [N39.0] 02/19/2019 Yes    Aortoiliac occlusive disease [I74.09] 02/13/2019 Yes    Dementia [F03.90] 03/06/2018 Yes    CKD (chronic kidney disease) stage 3, GFR 30-59 ml/min [N18.3] 10/19/2016 Yes    Essential hypertension [I10] 10/13/2015 Yes    Atrial fibrillation [I48.91] 02/21/2013 Yes      Problems Resolved During this Admission:     -admitted to hospitalist service  -walker assisted toe-touch weight-bearing to the left lower extremity  -Pain control  -DVT prophylaxis  -on discharge, can follow up in 10-14 days with repeat x-ray    Thank you for your consult    Yevgeniy Parnell MD  Orthopedics  [facility]

## 2019-02-19 NOTE — H&P
"[facility] Hospital Medicine  History & Physical    Patient Name: Bernarda Mathis  MRN: [id number]  Admission Date: 2/18/2019  Attending Physician: German Carl MD   Primary Care Provider: Provider Notinsystem         Patient information was obtained from past medical records and ER records.     Subjective:     Principal Problem:Closed fracture of left hip    Chief Complaint:   Chief Complaint   Patient presents with    Fall     Pt fell at home and  is experiencing pain, numbness and tingling in the left hip and left leg        HPI: 83 y.o. female patient  with history of extensive atherosclerosis with diffuse ectasia of the thoracic and abdominal aorta ,atrial fibrillation on anticoagulation,CAD s/p CABG  Who recently left AMA from [facility] . She was admitted  at that time for 5 days and was seen by CVT -Dr Cifuentes at [place]. 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   On 2/15 she had Aortogram with selective celiac and superior mesenteric artery angiography and conscious moderate sedation. 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  Multiple attempts with different guides and wires were used to enter the superior mesenteric artery ostium, but these were ultimately unsuccessful.  Selective celiac artery angiography did not reveal any apparent significant stenosis"  The patient subsequently left AMA.    She presented at this time with history of left hip pain following a fall at home.CT scan of the hip showed - acute fracture through the greater trochanter of the left femur.  She was seen in the ED and at this time, her major compliant is pain at the site of fracture . She is now DNR              Past Medical History:   Diagnosis Date    Breast cancer     Coronary artery disease     Thyroid disease "     Urinary frequency        Past Surgical History:   Procedure Laterality Date    APPENDECTOMY      BILATERAL MASTECTOMY      BLADDER SUSPENSION      BREAST SURGERY      CARDIAC SURGERY      CHOLECYSTECTOMY      CORONARY ARTERY BYPASS GRAFT      ILIAC ARTERY STENT      INSERTION OF PACEMAKER         Review of patient's allergies indicates:  No Known Allergies    No current facility-administered medications on file prior to encounter.      Current Outpatient Medications on File Prior to Encounter   Medication Sig    apixaban (ELIQUIS) 5 mg Tab Take 5 mg by mouth 2 (two) times daily.    aspirin (ECOTRIN) 81 MG EC tablet Take 81 mg by mouth once daily.    digoxin (LANOXIN) 125 mcg tablet Take 125 mcg by mouth once daily.    levothyroxine (SYNTHROID) 100 MCG tablet Take 100 mcg by mouth once daily.    memantine (NAMENDA) 10 MG Tab Take 5 mg by mouth 2 (two) times daily.    metoprolol succinate (TOPROL-XL) 100 MG 24 hr tablet Take 200 mg by mouth once daily.    oxybutynin (DITROPAN) 5 MG Tab Take 5 mg by mouth 2 (two) times daily.     Family History     None        Tobacco Use    Smoking status: Former Smoker     Types: Cigarettes     Last attempt to quit: 2000     Years since quittin.1    Smokeless tobacco: Never Used   Substance and Sexual Activity    Alcohol use: No     Frequency: Never    Drug use: No    Sexual activity: Not on file     Review of Systems   Reason unable to perform ROS: patient is weak , has pain at site of fracture    Constitutional: Negative for chills and fatigue.   HENT: Negative for congestion, ear pain, facial swelling, sinus pressure and sore throat.    Eyes: Negative for pain.   Respiratory: Negative for apnea, chest tightness, shortness of breath and stridor.    Cardiovascular: Negative for chest pain, palpitations and leg swelling.   Gastrointestinal: Negative for abdominal distention, abdominal pain, diarrhea and nausea.   Endocrine: Negative for polydipsia  and polyphagia.   Genitourinary: Negative for decreased urine volume, difficulty urinating, frequency and genital sores.   Musculoskeletal: Negative for arthralgias and gait problem.        Pain at site of recent left hip fracture    Neurological: Negative for light-headedness and headaches.   Hematological: Negative for adenopathy.   Psychiatric/Behavioral: Negative for agitation, confusion and decreased concentration.     Objective:     Vital Signs (Most Recent):  Temp: 97.6 °F (36.4 °C) (02/18/19 1634)  Pulse: 61 (02/18/19 1601)  Resp: (!) 22 (02/18/19 1601)  BP: (!) 85/46 (02/18/19 1601)  SpO2: 100 % (02/18/19 1601) Vital Signs (24h Range):  Temp:  [95.2 °F (35.1 °C)-97.6 °F (36.4 °C)] 97.6 °F (36.4 °C)  Pulse:  [60-77] 61  Resp:  [14-23] 22  SpO2:  [99 %-100 %] 100 %  BP: ()/(30-53) 85/46     Weight: 47.6 kg (105 lb)  Body mass index is 16.95 kg/m².    Physical Exam   Constitutional: She is oriented to person, place, and time. Vital signs are normal. She appears well-developed.   Appears weak   HENT:   Head: Normocephalic and atraumatic.   Eyes: Conjunctivae and EOM are normal. Pupils are equal, round, and reactive to light.   Neck: Normal range of motion. Neck supple. No thyroid mass and no thyromegaly present.   Cardiovascular: Normal rate, normal heart sounds and intact distal pulses.   Pulmonary/Chest: Effort normal and breath sounds normal. No accessory muscle usage. No respiratory distress.   Abdominal: Soft. Bowel sounds are normal. She exhibits no mass. There is no tenderness.   Musculoskeletal: Normal range of motion.   Pain over left hip    Neurological: She is alert and oriented to person, place, and time.   Skin: Skin is intact. No rash noted.   Psychiatric: She has a normal mood and affect.   Nursing note and vitals reviewed.        CRANIAL NERVES     CN III, IV, VI   Pupils are equal, round, and reactive to light.  Extraocular motions are normal.        Significant Labs:   BMP:   Recent Labs    Lab 02/18/19  0937   *   *   K 4.1      CO2 19*   BUN 43*   CREATININE 1.7*   CALCIUM 7.3*   MG 1.6     All pertinent labs within the past 24 hours have been reviewed.    Significant Imaging: I have reviewed and interpreted all pertinent imaging results/findings within the past 24 hours.    Assessment/Plan:     * Closed fracture of left hip      Will consult  ortho ,generous analgesia .     UTI (urinary tract infection)      Will use rocephin , follow cultures , was given Zosyn in the ED.     Dementia      Will continue namenda ,supportive care      Hypothyroidism      Will continue synthroid, check TSH      Essential hypertension      Will continue metoprolol,closely monitor BP     CKD (chronic kidney disease) stage 3, GFR 30-59 ml/min      Will monitor closely , avoid nephrotoxic meds     Atrial fibrillation      Will restart eliquis if no orthopedic surgery intervention is planned. Will continue metoprolol and digoxin,telemetry monitoring        Aortoiliac occlusive disease      Previous history from [place] reviewed- she has recent failed attempt by CVT on 2/15 to cross the superior mesenteric ostium during aortogram with selective celiac and superior mesenteric artery angiography .  She was seen by vascular surgery and no acute intervention is planned       VTE Risk Mitigation (From admission, onward)        Ordered     apixaban tablet 5 mg  2 times daily      02/18/19 2138     IP VTE HIGH RISK PATIENT  Once      02/18/19 2138     Reason for No Pharmacological VTE Prophylaxis  Once      02/18/19 2138             German Carl MD  Department of Hospital Medicine   [facility]

## 2019-02-19 NOTE — PLAN OF CARE
Problem: Adult Inpatient Plan of Care  Goal: Plan of Care Review  Outcome: Ongoing (interventions implemented as appropriate)  POC reviewed with pt, verbalized understanding. Pt remained free from falls, fall precautions in place. Pt is 70s-90s on monitor, paced. VS monitored. Pt refusing turns.  Pt IV intact, infusing NS at 150ml/hr. PRN pain medication given. No other c/o at this time. Call bell and personal belongings within reach. Hourly rounding complete. Reminded to call for assistance. Will continue to monitor.

## 2019-02-20 NOTE — DISCHARGE SUMMARY
"[facility] Hospital Medicine  Discharge Summary      Patient Name: Bernarda Mathis  MRN: [id number]  Admission Date: 2/18/2019  Hospital Length of Stay: 2 days  Discharge Date and Time:  02/20/2019 2:04 PM  Attending Physician: German Carl MD   Discharging Provider: SANDRA Sullivan  Primary Care Provider: Marleen Olson      HPI:   83 y.o. female patient  with history of extensive atherosclerosis with diffuse ectasia of the thoracic and abdominal aorta ,atrial fibrillation on anticoagulation,CAD s/p CABG  Who recently left AMA from [facility] . She was admitted  at that time for 5 days and was seen by CVT -Dr Cifuentes at [place]. 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   On 2/15 she had Aortogram with selective celiac and superior mesenteric artery angiography and conscious moderate sedation. 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  Multiple attempts with different guides and wires were used to enter the superior mesenteric artery ostium, but these were ultimately unsuccessful.  Selective celiac artery angiography did not reveal any apparent significant stenosis"  The patient subsequently left AMA.    She presented at this time with history of left hip pain following a fall at home.CT scan of the hip showed - acute fracture through the greater trochanter of the left femur.  She was seen in the ED and at this time, her major compliant is pain at the site of fracture . She is now DNR              Procedure(s) (LRB):  LAPAROTOMY, EXPLORATORY (N/A)      Hospital Course:   Patient admitted to  for closed fx of L hip under the care of Hospital Medicine. Ortho was consulted and states fx is nonsurgical and recommends f/u in clinic in 1 week. Vascular was consulted who stated no obvious " acute vascular issues at this point. Likely has significant chronic vascular disease and pt my f/u with cardiologist as OP or with VSC as needed. PT/OT consulted. Hypotensive and receiving IVF's. Patient with increased WBC - lactic elevated - sepsis. Discussed DNR with family, who continues to agree with DNR status. Pt c/o abd pain, xray shows small bowel obstruction. Gen Surgery consulted who took pt down for surgery - patient suffered cardiac arrest in pre-op and was brought back to telemetry, where she was pronounced dead by Dr. Frandy Almanzar at 13:54 from Cardiac Arrest.         Consults:   Consults (From admission, onward)        Status Ordering Provider     Inpatient consult to Orthopedic Surgery  Once     Provider:  Yevgeniy Parnell MD    Completed NORMAN INTERIANO     Pharmacy to dose Vancomycin consult  Once     Provider:  (Not yet assigned)    Acknowledged NORMAN INTERIANO          No new Assessment & Plan notes have been filed under this hospital service since the last note was generated.  Service: Hospital Medicine    Final Active Diagnoses:    Diagnosis Date Noted POA    PRINCIPAL PROBLEM:  Closed fracture of left hip [S72.002A] 2019 Yes    UTI (urinary tract infection) [N39.0] 2019 Yes    Aortoiliac occlusive disease [I74.09] 2019 Yes    Chronic mesenteric ischemia [K55.1] 2019 Yes    Dementia [F03.90] 2018 Yes    CKD (chronic kidney disease) stage 3, GFR 30-59 ml/min [N18.3] 10/19/2016 Yes    Essential hypertension [I10] 10/13/2015 Yes    Atrial fibrillation [I48.91] 2013 Yes      Problems Resolved During this Admission:       Discharged Condition:     Disposition:     Follow Up:  Follow-up Information     Yevgeniy Parnell MD In 1 week.    Specialty:  Orthopedic Surgery  Contact information:  [address]  [place] [address] [phone]                 Patient Instructions:   No discharge procedures on  file.    Significant Diagnostic Studies:      Pending Diagnostic Studies:     Procedure Component Value Units Date/Time    Hematocrit [[id number]] Collected:  19 1308    Order Status:  Sent Lab Status:  In process Updated:  19 1309    Specimen:  Blood     Narrative:       Collection has been rescheduled by HEW at 2019 11:14 Reason:    talking with family asked to come back  Collection has been rescheduled by RTJ at 2019 12:39 Reason:   Patient unavailable  Collection has been rescheduled by ENS at 2019 12:50 Reason:   Patient unavailable    Hemoglobin [[id number]] Collected:  19 1308    Order Status:  Sent Lab Status:  In process Updated:  19 1309    Specimen:  Blood     Narrative:       Collection has been rescheduled by HEW at 2019 11:14 Reason:    talking with family asked to come back  Collection has been rescheduled by RTJ at 2019 12:39 Reason:   Patient unavailable  Collection has been rescheduled by ENS at 2019 12:50 Reason:   Patient unavailable         Medications:  None (patient  at medical facility)    Indwelling Lines/Drains at time of discharge:   Lines/Drains/Airways     Drain                 Urethral Catheter 19 1120 Latex 16 Fr. 2 days         NG/OG Tube 19 0630 nasogastric 16 Fr. Left nostril less than 1 day                Time spent on the discharge of patient: 50 minutes  Patient was seen and examined on the date of discharge and determined to be suitable for discharge.         MARY Sullivan-ANNABEL  Department of Hospital Medicine  [facility]

## 2019-02-20 NOTE — PROGRESS NOTES
Notified nurse of critical lactic acid.  Patient concurrently receiving IV fluids, on antibiotic therapy.  Will repeat lactic.  Consider related to decreased perfusion post blood pressure medication, patient received 200 mg Lopressor this morning with subsequent drop in blood pressure.  Lopressor adjusted to 100 mg daily with holding parameters if blood pressure less than 120 systolic or heart rate less than 60.  Continue to monitor.  To note midodrine was ordered today patient received 1 dose but then it was DC'd.  Unclear as to why, consider additional midodrine if necessary pending course.  Further evaluation/diagnostics/interventions/consults pending course

## 2019-02-20 NOTE — ANESTHESIA PREPROCEDURE EVALUATION
02/20/2019  Bernarda Mathis is a 83 y.o., female.    Anesthesia Evaluation    I have reviewed the Patient Summary Reports.        Review of Systems  Anesthesia Hx:  No problems with previous Anesthesia  Denies Family Hx of Anesthesia complications.   Denies Personal Hx of Anesthesia complications.   Hematology/Oncology:         -- Anemia: Hematology Comments: Coagulopathic (high INR,PTT)  --  Cancer in past history:  Breast   Cardiovascular:   CAD  CABG/stent Dysrhythmias atrial fibrillation ECG has been reviewed. pacer   Renal/:   Chronic Renal Disease, CRI Metabolic acidosis, Inc cr   Hepatic/GI:   Bowel ischemia,lactic acidosis,    Neurological:   Neuromuscular Disease, dementia   Psych:   Psychiatric History          Physical Exam  General:  Malnutrition    Airway/Jaw/Neck:  Airway Findings:       Heart/Vascular:  Heart Findings: Rate: Normal        Mental Status:  Mental Status Findings:  Lethargic, Somnolent         Anesthesia Plan  Type of Anesthesia, risks & benefits discussed:  Anesthesia Type:  general  Patient's Preference:   Intra-op Monitoring Plan: standard ASA monitors, central line and arterial line  Intra-op Monitoring Plan Comments:   Post Op Pain Control Plan: per primary service following discharge from PACU  Post Op Pain Control Plan Comments:   Induction:   IV  Beta Blocker:         Informed Consent: Patient representative understands risks and agrees with Anesthesia plan.  Questions answered. Anesthesia consent signed with patient.  ASA Score: 4  emergent   Day of Surgery Review of History & Physical:        Anesthesia Plan Notes: Pt lethargic,  Comatose, only responding to deep stimuli.  Discussed with family (sons) about the DNR status.  High risk anesthesia likely will require invasive monitoring,postop ventilation,blood transfusion.        Ready For Surgery From Anesthesia  Perspective.

## 2019-02-20 NOTE — NURSING
Spoke with Dr. Carl. He agrees that due to pt's current state pt meets criteria for john. Pt deteriorating and refusing to turn due to severe pain. Pt family also requested no turns.

## 2019-02-20 NOTE — ASSESSMENT & PLAN NOTE
Concern for bowel ischemia  To or for ex lap  iIVFs  recently underwent are angio at outside hospital showing significant mesenteric vessel occlusion ultimately left AMA and came to [facility]

## 2019-02-20 NOTE — CONSULTS
"[facility] -   General Surgery  Consult Note    Patient Name: Bernarda Mathis  MRN: [id number]  Code Status: DNR  Admission Date: 2/18/2019  Hospital Length of Stay: 2 days  Attending Physician: German Carl MD  Primary Care Provider: Provider [employer]    Patient information was obtained from patient, relative(s) and past medical records.     Consults  Subjective:     Principal Problem: Closed fracture of left hip    History of Present Illness: 84 y/o female referred for bowel obstruction. The patient has progressively declined since admit with worsened abdominal distention. NGT placed this am, and CT done showing dilated small bowel with less dialted distal small bowel. Some colon wall thickening and ascites. Lactic acid increasing and kidney function worsening.    No current facility-administered medications on file prior to encounter.      Current Outpatient Medications on File Prior to Encounter   Medication Sig    apixaban (ELIQUIS) 5 mg Tab Take 5 mg by mouth 2 (two) times daily.    aspirin (ECOTRIN) 81 MG EC tablet Take 81 mg by mouth once daily.    digoxin (LANOXIN) 125 mcg tablet Take 125 mcg by mouth once daily.    levothyroxine (SYNTHROID) 100 MCG tablet Take 100 mcg by mouth once daily.    memantine (NAMENDA) 10 MG Tab Take 5 mg by mouth 2 (two) times daily.    metoprolol succinate (TOPROL-XL) 100 MG 24 hr tablet Take 200 mg by mouth once daily.    oxybutynin (DITROPAN) 5 MG Tab Take 5 mg by mouth 2 (two) times daily.    rosuvastatin (CRESTOR) 10 MG tablet Take 10 mg by mouth once daily.       Review of patient's allergies indicates:   Allergen Reactions    Chlorhexidine gluconate Other (See Comments)     " I had a bad reaction on my skin when I used this last."    Adhesive Rash       Past Medical History:   Diagnosis Date    Breast cancer     Coronary artery disease     Thyroid disease     Urinary frequency      Past Surgical History:   Procedure Laterality Date    " APPENDECTOMY      BILATERAL MASTECTOMY      BLADDER SUSPENSION      BREAST SURGERY      CARDIAC SURGERY      CHOLECYSTECTOMY      CORONARY ARTERY BYPASS GRAFT      ILIAC ARTERY STENT      INSERTION OF PACEMAKER       Family History     None        Tobacco Use    Smoking status: Former Smoker     Types: Cigarettes     Last attempt to quit: 2010     Years since quittin.1    Smokeless tobacco: Never Used   Substance and Sexual Activity    Alcohol use: No     Frequency: Never    Drug use: No    Sexual activity: Not on file     Review of Systems   Unable to perform ROS: Mental status change (decline in mental status)     Objective:     Vital Signs (Most Recent):  Temp: 97.5 °F (36.4 °C) (19 113)  Pulse: 79 (19 113)  Resp: 18 (19 113)  BP: (!) 88/50 (19 113)  SpO2: 96 % (19) Vital Signs (24h Range):  Temp:  [97.3 °F (36.3 °C)-98 °F (36.7 °C)] 97.5 °F (36.4 °C)  Pulse:  [64-87] 79  Resp:  [16-24] 18  SpO2:  [93 %-98 %] 96 %  BP: ()/(40-56) 88/50     Weight: 47.6 kg (104 lb 15 oz)  Body mass index is 16.94 kg/m².    Physical Exam   Constitutional:   Frail cachetic appears uncomfortable   Cardiovascular: Normal rate.   Pulmonary/Chest: Effort normal.   Breath sounds decreased at bases   Abdominal: Soft. She exhibits distension. There is tenderness (grimaces when pressed upon).   Well healed surgical incision   Neurological:   arousable but incoherent   Vitals reviewed.      Significant Labs:  CBC:   Recent Labs   Lab 19  0352   WBC 31.16*   RBC 2.86*   HGB 8.6*   HCT 26.1*      MCV 91   MCH 30.1   MCHC 33.0     CMP:   Recent Labs   Lab 19  0352   GLU 69*   CALCIUM 6.8*   ALBUMIN 1.4*   PROT 3.5*   *   K 4.6   CO2 11*      BUN 48*   CREATININE 2.5*   ALKPHOS 93   ALT 23   AST 66*   BILITOT 0.4     Coagulation:   Recent Labs   Lab 19  0602   LABPROT 21.7*   INR 2.1*   APTT 73.0*       Significant Diagnostics:  CT:  1. An NG tube  is in place. The tip is in the stomach. There are dilated loops of small bowel. On the left side of the abdomen there is a loop of jejunum that has a diameter of 4.0 cm.  The ileum as a diameter of 1.6 cm.  This is consistent with the patient's history and characteristic of a small-bowel obstruction in the region of the junction of the jejunum and ileum.  2. There is a small amount of ascites. There is no pneumoperitoneum.  3. There is an enlarged right inguinal lymph node. It has a short axis measurement of 18 mm.  4. There is a marked amount of edema in the soft tissue around the thorax, abdomen, and pelvis.  5. There are moderate sized bilateral pleural effusions with associated atelectasis.  6. There is a 17 mm oval shaped hypodense area in the right lobe of the liver. This area has a Hounsfield measurement of 9.  This is characteristic of a cyst.  7. The kidneys are heterogeneous in appearance. There is a 3 mm nonobstructive stone in the midpole of the left kidney.  If additional imaging evaluation is clinically indicated, recommend consideration of an ultrasound examination of the kidneys.    Assessment/Plan:     CKD (chronic kidney disease) stage 3, GFR 30-59 ml/min    KATIUSKA on CKD  Recommend nephrology consult     Chronic mesenteric ischemia    Concern for bowel ischemia  To or for ex lap  iIVFs  recently underwent are angio at outside hospital showing significant mesenteric vessel occlusion ultimately left AMA and came to [facility]  Risks and benefits discussed with family including pain, bleeding, infection, injury to underlying abdominal organs, possible bowel resection, possible colostomy, possible delayed abdominal closure, prolonged intubation, worsening of condition, no diagnosis, need for further procedure  Option of no surgery discussed with family however the patient would likely continue to get worse and not survive.     VTE Risk Mitigation (From admission, onward)        Ordered     IP VTE HIGH RISK  PATIENT  Once      02/18/19 2138     Reason for No Pharmacological VTE Prophylaxis  Once      02/18/19 2138          Thank you for your consult. I will follow-up with patient. Please contact us if you have any additional questions.    Isauro Valdovinos MD  General Surgery  [facility] -     Addendum:  While patient in the preop area, patient's heart rate decreased to the 20s.  Patient is a DNR. Discussion with family that patient would likely not survive induction and surgery.  At this time family elected to move to comfort care measures

## 2019-02-20 NOTE — PROGRESS NOTES
General surgery presented to bedside examined patient.  Stat read read of abdomen x-ray impression multiple dilated loops of small bowel, obstruction versus ileus.  Agrees with NG tube and decompression.  States care really depends on how aggressive patient and family want us to pursue.  Does recommend CT abdomen pelvis with contrast if possible would be best.  Heparin infusion and consideration of transferring to ICU also pins on how aggressive treatment desired.  Reported off to the a.m. Team, Dr. Carl    Additionally independent preliminary read of chest x-ray for NG tube placement:  Tube visualized in stomach, okay to use.  Patient is set to low intermittent suction

## 2019-02-20 NOTE — PROGRESS NOTES
Vancomycin Progress Note    Indication: sepsis (bowel obstruction vs ileus)  Lactate = 7.6 (doubled since 23:00 last night)  WBC = 31.16 (trending up; was 12.52 on 2/18)  Tmax = 98  Cultures:     Blood x 2 (2/18) -- NGTD     Urine (2/18) -- GNR > 100K (ID/susceptibility pending) --> also receiving meropenem      Blood x 2 (2/20) -- pending  SCr = 2.5 (trending up daily) --> continue pulse dosing approach    750 mg dose given yesterday @ 1518  Random level @ 0352 this AM = 15.8 mcg/ml --> re-dosed with 750 mg @ 0557  Continue vancomycin & meropenem per Dr. Carl   Next random level due tomorrow 2/21 with AM labs  Re-dose when level < 18 mcg/ml    Thank you for allowing us to participate in this patient's care.   Katherine McArdle, Pharm.D. 2/20/2019 9:46 AM

## 2019-02-20 NOTE — PROGRESS NOTES
Pt lab result lactate 3.9 .  NP was notified.  New lab was reordered Stat.  Mag. Sulfate IVP was order and administered.    will continue to monitor.

## 2019-02-20 NOTE — ANESTHESIA POSTPROCEDURE EVALUATION
"Anesthesia Post Evaluation    Patient: Bernarda Mathis    Procedure(s) Performed: Procedure(s) (LRB):  LAPAROTOMY, EXPLORATORY (N/A)        Anesthesia complication: pt  in the preop holding area. pt was DNR .              Visit Vitals  BP (!) 88/50 (BP Location: Right leg, Patient Position: Lying)   Pulse 79   Temp 36.4 °C (97.5 °F) (Oral)   Resp 18   Ht 5' 6" (1.676 m)   Wt 47.6 kg (104 lb 15 oz)   SpO2 96%   Breastfeeding? No   BMI 16.94 kg/m²       Pain/Shahrzad Score: Pain Rating Prior to Med Admin: 5 (2019  5:28 AM)  Pain Rating Post Med Admin: 3 (2019 11:14 PM)        "

## 2019-02-20 NOTE — ANESTHESIA POSTPROCEDURE EVALUATION
"Anesthesia Post Evaluation    Patient: Bernarda Mathis    Procedure(s) Performed: Procedure(s) (LRB):  LAPAROTOMY, EXPLORATORY (N/A)        Anesthetic complications: no (pt  in preop holding area .)              Visit Vitals  BP (!) 88/50 (BP Location: Right leg, Patient Position: Lying)   Pulse 79   Temp 36.4 °C (97.5 °F) (Oral)   Resp 18   Ht 5' 6" (1.676 m)   Wt 47.6 kg (104 lb 15 oz)   SpO2 96%   Breastfeeding? No   BMI 16.94 kg/m²       Pain/Shahrzad Score: Pain Rating Prior to Med Admin: 5 (2019  5:28 AM)  Pain Rating Post Med Admin: 3 (2019 11:14 PM)        "

## 2019-02-20 NOTE — PLAN OF CARE
Problem: Adult Inpatient Plan of Care  Goal: Patient-Specific Goal (Individualization)  Outcome: Ongoing (interventions implemented as appropriate)  Pt AAO ,confused at times.  POC reviewed with pt and son.haresh understanding  .NSR , Paced on tele monitor. Iv intact.john intact.  Decreased urinary output.  L arm edema noted .elevated on pillow.  NS infusing @150 ml/hr continuous.   Instructed to call for assistance.  Fall precautions in place.  Will continue to monitor.

## 2019-02-20 NOTE — NURSING
IV fluids restarted and Ct informed of STAT CT order. Called son Reggei and left voicemail for him to call back in regards to patient status and plan of care. Will continue to monitor.

## 2019-02-20 NOTE — PT/OT/SLP PROGRESS
Occupational Therapy      Patient Name:  Bernarda Mathis   MRN:  [id number]    OT attempted eval at 9:10am, pt unable to follow command at this time. OT eval initiated through chart review, will attempt to complete at later time/ date.      OT spoke to SANDRA Arthur at 10:00am about pt's care and was told that order would be cancelled secondary to pt's medical status        Dai Markham, PT/OT  2/20/2019

## 2019-02-20 NOTE — PROGRESS NOTES
Updated General surgery on patient's x-ray and lactic acid.  They recommend stat CT and restarting IV fluids.  Stat CT ordered IV hydration restarted.  Yessica SHEA made aware.

## 2019-02-20 NOTE — ANESTHESIA POSTPROCEDURE EVALUATION
"Anesthesia Post Evaluation    Patient: Bernarda Mathis    Procedure(s) Performed: Procedure(s) (LRB):  LAPAROTOMY, EXPLORATORY (N/A)    Anesthesia plan: pt  in preop holding area. pt was DNR.                Visit Vitals  BP (!) 88/50 (BP Location: Right leg, Patient Position: Lying)   Pulse 79   Temp 36.4 °C (97.5 °F) (Oral)   Resp 18   Ht 5' 6" (1.676 m)   Wt 47.6 kg (104 lb 15 oz)   SpO2 96%   Breastfeeding? No   BMI 16.94 kg/m²       Pain/Shahrzad Score: Pain Rating Prior to Med Admin: 5 (2019  5:28 AM)  Pain Rating Post Med Admin: 3 (2019 11:14 PM)        "

## 2019-02-20 NOTE — PROGRESS NOTES
Patient laying in bed, not responding to verbal stimuli or to touch. Dr. Munoz and MINDY Chamberlain at bedside. I was notified by Jhon Lyon RN that patient's HR was dropping to the 20's on the cardiac monitor. Called son's back to pre-op to see patient. No heartbeat palpated or auscultated. Patient is DNR.

## 2019-02-20 NOTE — HOSPITAL COURSE
Patient admitted to IP for closed fx of L hip under the care of Hospital Medicine. Ortho was consulted and states fx is nonsurgical and recommends f/u in clinic in 1 week. Vascular was consulted who stated no obvious acute vascular issues at this point. Likely has significant chronic vascular disease and pt my f/u with cardiologist as OP or with VSC as needed. PT/OT consulted. Hypotensive and receiving IVF's. Patient with increased WBC - lactic elevated - sepsis. Discussed DNR with family, who continues to agree with DNR status. Pt c/o abd pain, xray shows small bowel obstruction. Gen Surgery consulted who took pt down for surgery - patient suffered cardiac arrest in pre-op and was brought back to telemetry, where she was pronounced dead by Dr. Frandy Almanzar at 13:54 from Cardiac Arrest.

## 2019-02-20 NOTE — HPI
82 y/o female referred for bowel obstruction. The patient has progressively declined since admit with worsened abdominal distention. NGT placed this am, and CT done showing dilated small bowel with less dialted distal small bowel. Some colon wall thickening and ascites. Lactic acid increasing and kidney function worsening.

## 2019-02-20 NOTE — PT/OT/SLP PROGRESS
Physical Therapy      Patient Name:  Bernarda Mathis   MRN:  [id number]    PT attempted eval at 9:10am, pt unable to follow command at this time. PT eval initiated through chart review, will attempt to complete at later time/ date.     PT spoke to SANDRA Arthur at 10:00am about pt's care and was told that order would be cancelled secondary to pt's medical status.     Dai Markham, PT/OT   2/20/2019

## 2019-02-20 NOTE — SUBJECTIVE & OBJECTIVE
"No current facility-administered medications on file prior to encounter.      Current Outpatient Medications on File Prior to Encounter   Medication Sig    apixaban (ELIQUIS) 5 mg Tab Take 5 mg by mouth 2 (two) times daily.    aspirin (ECOTRIN) 81 MG EC tablet Take 81 mg by mouth once daily.    digoxin (LANOXIN) 125 mcg tablet Take 125 mcg by mouth once daily.    levothyroxine (SYNTHROID) 100 MCG tablet Take 100 mcg by mouth once daily.    memantine (NAMENDA) 10 MG Tab Take 5 mg by mouth 2 (two) times daily.    metoprolol succinate (TOPROL-XL) 100 MG 24 hr tablet Take 200 mg by mouth once daily.    oxybutynin (DITROPAN) 5 MG Tab Take 5 mg by mouth 2 (two) times daily.    rosuvastatin (CRESTOR) 10 MG tablet Take 10 mg by mouth once daily.       Review of patient's allergies indicates:   Allergen Reactions    Chlorhexidine gluconate Other (See Comments)     " I had a bad reaction on my skin when I used this last."    Adhesive Rash       Past Medical History:   Diagnosis Date    Breast cancer     Coronary artery disease     Thyroid disease     Urinary frequency      Past Surgical History:   Procedure Laterality Date    APPENDECTOMY      BILATERAL MASTECTOMY      BLADDER SUSPENSION      BREAST SURGERY      CARDIAC SURGERY      CHOLECYSTECTOMY      CORONARY ARTERY BYPASS GRAFT      ILIAC ARTERY STENT      INSERTION OF PACEMAKER       Family History     None        Tobacco Use    Smoking status: Former Smoker     Types: Cigarettes     Last attempt to quit: 2010     Years since quittin.1    Smokeless tobacco: Never Used   Substance and Sexual Activity    Alcohol use: No     Frequency: Never    Drug use: No    Sexual activity: Not on file     Review of Systems   Unable to perform ROS: Mental status change (decline in mental status)     Objective:     Vital Signs (Most Recent):  Temp: 97.5 °F (36.4 °C) (19 1137)  Pulse: 79 (19 1137)  Resp: 18 (19 1137)  BP: (!) 88/50 " (02/20/19 1137)  SpO2: 96 % (02/20/19 1137) Vital Signs (24h Range):  Temp:  [97.3 °F (36.3 °C)-98 °F (36.7 °C)] 97.5 °F (36.4 °C)  Pulse:  [64-87] 79  Resp:  [16-24] 18  SpO2:  [93 %-98 %] 96 %  BP: ()/(40-56) 88/50     Weight: 47.6 kg (104 lb 15 oz)  Body mass index is 16.94 kg/m².    Physical Exam   Constitutional:   Frail cachetic appears uncomfortable   Cardiovascular: Normal rate.   Pulmonary/Chest: Effort normal.   Breath sounds decreased at bases   Abdominal: Soft. She exhibits distension. There is tenderness (grimaces when pressed upon).   Well healed surgical incision   Neurological:   arousable but incoherent   Vitals reviewed.      Significant Labs:  CBC:   Recent Labs   Lab 02/20/19  0352   WBC 31.16*   RBC 2.86*   HGB 8.6*   HCT 26.1*      MCV 91   MCH 30.1   MCHC 33.0     CMP:   Recent Labs   Lab 02/20/19  0352   GLU 69*   CALCIUM 6.8*   ALBUMIN 1.4*   PROT 3.5*   *   K 4.6   CO2 11*      BUN 48*   CREATININE 2.5*   ALKPHOS 93   ALT 23   AST 66*   BILITOT 0.4     Coagulation:   Recent Labs   Lab 02/20/19  0602   LABPROT 21.7*   INR 2.1*   APTT 73.0*       Significant Diagnostics:  CT:  1. An NG tube is in place. The tip is in the stomach. There are dilated loops of small bowel. On the left side of the abdomen there is a loop of jejunum that has a diameter of 4.0 cm.  The ileum as a diameter of 1.6 cm.  This is consistent with the patient's history and characteristic of a small-bowel obstruction in the region of the junction of the jejunum and ileum.  2. There is a small amount of ascites. There is no pneumoperitoneum.  3. There is an enlarged right inguinal lymph node. It has a short axis measurement of 18 mm.  4. There is a marked amount of edema in the soft tissue around the thorax, abdomen, and pelvis.  5. There are moderate sized bilateral pleural effusions with associated atelectasis.  6. There is a 17 mm oval shaped hypodense area in the right lobe of the liver. This  area has a Hounsfield measurement of 9.  This is characteristic of a cyst.  7. The kidneys are heterogeneous in appearance. There is a 3 mm nonobstructive stone in the midpole of the left kidney.  If additional imaging evaluation is clinically indicated, recommend consideration of an ultrasound examination of the kidneys.

## 2019-02-20 NOTE — PROGRESS NOTES
Was informed by the nurse of patient's a.m. lab work worsening.  Repeat labs as ordered.  Patient has been seen and examined.  Patient is awake and alert oriented to self and situation at this time, does not appear to be any acute distress but is appearing uncomfortable at this time. appears acute on chronically ill, pale.  Hypoactive bowel sounds. Patient endorses abdominal pain with palpation on exam.  Positive JVD and anasarca on exam.  Attempted obtain ABG on successful VBG obtained to evaluate pH, as expected acidotic.  Abdominal x-ray ordered and image reviewed:  Independent preliminary self read--> gastric and bowel distension throughout concern for obstruction.  NG-tube ordered for decompression. Will order bicarb.  Patient is a DNR, does have chronic mesenteric ischemia and arterial occlusion that is non operable.  Patient is currently admitted for orthopedic injury that is non operable as well.  General surgery consulted, plan to come see patient.  Pending General surgery recommendations will consider transferring to ICU.  Patient oral medications stopped, which includes Eliquis and metoprolol, and adjusted necessary medications to IV route (digoxin, Synthroid).  Given episodes of hypotension earlier will only use metoprolol as needed.  Will await General surgery recommendations prior to using alternative for Eliquis like heparin will just hold at this time.

## 2019-02-20 NOTE — SIGNIFICANT EVENT
2/20  Called to bedside patient unresponsive, no heart sounds or breath sounds heard by ascultation. Pupils un-reactive, no response to painful stimuli. Patient pronounced dead at 13:54 on 2/20/2019. Cause of Death Cardiac Arrest. Our deepest sympathies are with the family in this their time of loss.

## 2019-02-20 NOTE — PT/OT/SLP PROGRESS
Occupational Therapy      Patient Name:  Bernarda Mathis   MRN:  [id number]    JEREMI BROWN INITIATED THIS PM VIA CHART REVIEW, PT WITH HIP FX. AWAITING SX., WILL COMPLETE JEREMI BROWN FOLLOWING SX.        Jenni Singleton, OT  2/19/2019   115

## 2019-02-20 NOTE — HISTORY AND PHYSICAL ADDENDUM
Patient returned to room 244 after son's left to go  patient's spouse. Spouse lives 1 hour away. Report given to floor nurses that son's will communicate with them.

## 2019-02-20 NOTE — ANESTHESIA POSTPROCEDURE EVALUATION
Anesthesia Post Evaluation    Patient: Bernarda Mathis    Procedure(s) Performed: Procedure(s) (LRB):  LAPAROTOMY, EXPLORATORY (N/A)    Anesthesia plan: Pt  in preop holding area before taking to OR . Pt was DNR .

## 2019-02-21 LAB
BACTERIA UR CULT: NORMAL
BACTERIA UR CULT: NORMAL

## 2019-02-21 NOTE — NURSING
[place] 's office called and notified that patient . [facility] also contacted and patient not candidate for organ donation. Will update primary nurse, Constance.

## 2019-02-21 NOTE — PLAN OF CARE
19 1550   Final Note   Assessment Type Final Discharge Note   Anticipated Discharge Disposition    Right Care Referral Info   Post Acute Recommendation Other  (Pt )

## 2019-02-21 NOTE — NURSING
"Pt brought back up from pre-op. Time of death called by Dr. Leonardo. Postmortem care completed. Family states, "we will have a  home picked tomorrow". Pt to be sent to [facility]  "

## 2019-02-23 LAB
BACTERIA BLD CULT: NORMAL
BACTERIA BLD CULT: NORMAL

## 2019-02-25 LAB — BACTERIA BLD CULT: NORMAL

## 2019-02-25 NOTE — PHYSICIAN QUERY
PT Name: Bernarda Mathis  MR #: [id number]    Physician Query Form - Atrial Fibrillation Specificity     CDS/: Joselin Noriega               Contact information:[phone]     This form is a permanent document in the medical record.     Query Date: February 25, 2019    By submitting this query, we are merely seeking further clarification of documentation. Please utilize your independent clinical judgment when addressing the question(s) below.    The medical record contains the following:   Indicators     Supporting Clinical Findings Location in Medical Record    x Atrial Fibrillation  Atrial fibrillation  Hospital Medicine H&P File Time: 2/19/2019 4:23 AM    x EKG results      x Medication Metoprolol and digoxin  Hospital Medicine H&P File Time: 2/19/2019 4:23 AM    x Treatment  Will restart eliquis if no orthopedic surgery intervention is planned. Will continue metoprolol and digoxin, telemetry monitoring.        [facility] Medicine H&P   File Time: 2/19/2019 4:23 AM    Other         Provider, please further specify the Atrial Fibrillation diagnosis.    [  ] Chronic   [  ] Paroxysmal   [ x ] Permanent   [  ] Other (please specify):   [  ] Clinically Undetermined       Please document in your progress notes daily for the duration of treatment until resolved, and include in your discharge summary.

## 2019-02-25 NOTE — PHYSICIAN QUERY
PT Name: Bernarda Mathis  MR #: [id number]     Physician Query Form - Etiology of Condition Clarification      CDS/: Joselin Noriega               Contact information:[phone]  This form is a permanent document in the medical record.     Query Date: February 25, 2019    By submitting this query, we are merely seeking further clarification of documentation.  Please utilize your independent clinical judgment when addressing the question(s) below.     The medical record contains the following:    Findings Supporting Clinical Information Location in Medical Record     Sepsis   Hypotensive and receiving IVF's. Patient with increased WBC - lactic elevated - sepsis. Discussed DNR with family, who continues to agree with DNR status. Pt c/o abd pain, xray shows small bowel obstruction. Gen Surgery consulted who took pt down for surgery - patient suffered cardiac arrest in pre-op and was brought back to telemetry, where she was pronounced dead by Dr. Frandy Almanzar at 13:54 from Cardiac Arrest.    Vancomycin Progress Note     Indication: sepsis (bowel obstruction vs ileus)  Lactate = 7.6 (doubled since 23:00 last night)  WBC = 31.16 (trending up; was 12.52 on 2/18)  Tmax = 98  Cultures:     Blood x 2 (2/18) -- NGTD     Urine (2/18) -- GNR > 100K (ID/susceptibility pending) --> also receiving meropenem      Blood x 2 (2/20) -- pending       Hospital Medicine Discharge Summary 2/20/2019                    Pharmacist Progress Notes 2/20/2019     Please document your best medical opinion regarding the etiology of _Sepsis_ ___ for which treatment is/was directed.     Provider use only     1. ( x    ) Sepsis due to UTI     2. ( x    ) Sepsis due to bowel obstruction vs ileus     3. (     ) Other, Please specify___________________________________________       [  ] Clinically Undetermined     Please document in your progress notes daily for the duration of treatment, until resolved, and include in your discharge summary.

## 2019-02-25 NOTE — PHYSICIAN QUERY
PT Name: Bernarda Mathis  MR #: [id number]    Physician Query Form - Cause and Effect Relationship Clarification      CDS/: Joselin Noriega               Contact information:[phone]    This form is a permanent document in the medical record.     Query Date: February 25, 2019    By submitting this query, we are merely seeking further clarification of documentation. Please utilize your independent clinical judgment when addressing the question(s) below.    The Medical record contains the following:  Supporting Clinical Findings   Location in record         UTI (urinary tract infection)       Will use rocephin , follow cultures , was given Zosyn in the ED.                                                                                                                                                                                [facility] Medicine H&P   File Time: 2/19/2019 4:23 AM       Urine Culture, Routine KLEBSIELLA PNEUMONIAE   >100,000 cfu/ml     Urine Culture, Routine   ESCHERICHIA COLI   > 100,000 cfu/ml                                                                                                                                                                                              Urine Culture Results 2/19/2019         Provider, please clarify if there is any correlation between _Klebsiella Pneumoniae and Escherichia Coli ________________________ and ___UTI_______________.           Are the conditions:      [  x] Due to or associated with each other   [  ] Unrelated to each other   [  ] Other (Please Specify): _________________________   [  ] Clinically Undetermined

## 2019-02-25 NOTE — PHYSICIAN QUERY
PT Name: Bernarda Mathis  MR #: [id number]     Physician Query Form - Documentation Clarification      CDS/: Joselin Noriega               Contact information: [phone]    This form is a permanent document in the medical record.     Query Date: February 25, 2019    By submitting this query, we are merely seeking further clarification of documentation. Please utilize your independent clinical judgment when addressing the question(s) below.    The Medical record reflects the following:    Supporting Clinical Findings Location in Medical Record       Attempted obtain ABG on successful VBG obtained to evaluate pH, as expected acidotic.     ABG's Venous sample   PH=7.042   PCO2=34.1   PO2=18   HCO3=9.2   Saturated O2=14    Hospital Medicine Progress Notes 2/20/2019         Lab Results 2/20/2017     Renal/:   Chronic Renal Disease, CRI Metabolic acidosis, Inc cr       Anesthesia Information 2/20/2019                                                                            Doctor, Please specify diagnosis or diagnoses associated with above clinical findings.    Provider Use Only     1.  (     x) Metabolic acidosis     2.  (     ) Other, Please specify_________________________________________                                                                                                             [  ] Clinically Undetermined
